# Patient Record
Sex: MALE | Race: WHITE | NOT HISPANIC OR LATINO | Employment: FULL TIME | URBAN - METROPOLITAN AREA
[De-identification: names, ages, dates, MRNs, and addresses within clinical notes are randomized per-mention and may not be internally consistent; named-entity substitution may affect disease eponyms.]

---

## 2019-08-31 ENCOUNTER — HOSPITAL ENCOUNTER (EMERGENCY)
Facility: HOSPITAL | Age: 44
Discharge: HOME | End: 2019-08-31
Attending: EMERGENCY MEDICINE
Payer: COMMERCIAL

## 2019-08-31 VITALS
TEMPERATURE: 99 F | BODY MASS INDEX: 27.16 KG/M2 | HEART RATE: 73 BPM | DIASTOLIC BLOOD PRESSURE: 90 MMHG | WEIGHT: 230 LBS | OXYGEN SATURATION: 99 % | HEIGHT: 77 IN | SYSTOLIC BLOOD PRESSURE: 138 MMHG | RESPIRATION RATE: 18 BRPM

## 2019-08-31 DIAGNOSIS — S63.259A DISLOCATION OF FINGER, INITIAL ENCOUNTER: Primary | ICD-10-CM

## 2019-08-31 PROCEDURE — 29130 APPL FINGER SPLINT STATIC: CPT | Mod: F7

## 2019-08-31 PROCEDURE — 63600175 PHARM REV CODE 636 W HCPCS: Performed by: EMERGENCY MEDICINE

## 2019-08-31 PROCEDURE — 26770 TREAT FINGER DISLOCATION: CPT | Mod: F7

## 2019-08-31 PROCEDURE — 96372 THER/PROPH/DIAG INJ SC/IM: CPT | Mod: 59

## 2019-08-31 PROCEDURE — 25000003 PHARM REV CODE 250: Performed by: EMERGENCY MEDICINE

## 2019-08-31 PROCEDURE — 99284 EMERGENCY DEPT VISIT MOD MDM: CPT | Mod: 25

## 2019-08-31 RX ORDER — KETOROLAC TROMETHAMINE 10 MG/1
10 TABLET, FILM COATED ORAL EVERY 6 HOURS PRN
Qty: 28 TABLET | Refills: 0 | Status: SHIPPED | OUTPATIENT
Start: 2019-08-31

## 2019-08-31 RX ORDER — LIDOCAINE HYDROCHLORIDE 10 MG/ML
10 INJECTION INFILTRATION; PERINEURAL
Status: COMPLETED | OUTPATIENT
Start: 2019-08-31 | End: 2019-08-31

## 2019-08-31 RX ORDER — MORPHINE SULFATE 4 MG/ML
8 INJECTION, SOLUTION INTRAMUSCULAR; INTRAVENOUS
Status: COMPLETED | OUTPATIENT
Start: 2019-08-31 | End: 2019-08-31

## 2019-08-31 RX ORDER — ACETAMINOPHEN 500 MG
500 TABLET ORAL EVERY 6 HOURS PRN
Qty: 28 TABLET | Refills: 0 | Status: SHIPPED | OUTPATIENT
Start: 2019-08-31

## 2019-08-31 RX ADMIN — MORPHINE SULFATE 8 MG: 4 INJECTION INTRAVENOUS at 05:08

## 2019-08-31 RX ADMIN — LIDOCAINE HYDROCHLORIDE 10 ML: 10 INJECTION, SOLUTION INFILTRATION; PERINEURAL at 05:08

## 2019-08-31 NOTE — ED TRIAGE NOTES
Pt presented to the ED after falling in his garage and dislocating his finger.  Pt states he fell and caught himself on his right hand and his middle finger got tucked under his hand that was bearing his weight.  Pt driven here by his wife.

## 2019-08-31 NOTE — ED PROVIDER NOTES
Encounter Date: 8/31/2019    SCRIBE #1 NOTE: I, Michelle Ahumada, am scribing for, and in the presence of,  Dr. Mccall. I have scribed the entire note.       History     Chief Complaint   Patient presents with    Hand Pain     Reports fell and landed on hand. Possible dislocation to R 3rd digit.      Morteza Cerrato is a 44 y.o. male who  has no past medical history on file.    The patient presents to the ED due to a hand injury after falling today. Patient says that he normally falls due to his weak ankle and today when he fell, he landed on his right hand. He decided to come to the ED to rule out possible dislocation of his 3rd digit of his right hand. Pain is worse with movement or palpation. He denies numbness, tingling, or any other complaints at this time.  He is left-hand dominant works in IT.     The history is provided by the patient.     Review of patient's allergies indicates:  No Known Allergies  History reviewed. No pertinent past medical history.  History reviewed. No pertinent surgical history.  History reviewed. No pertinent family history.  Social History     Tobacco Use    Smoking status: Never Smoker   Substance Use Topics    Alcohol use: Never     Frequency: Never    Drug use: Never     Review of Systems   Constitutional: Negative for chills and fever.   HENT: Negative for sore throat.    Respiratory: Negative for cough and shortness of breath.    Cardiovascular: Negative for chest pain.   Gastrointestinal: Negative for abdominal pain, nausea and vomiting.   Genitourinary: Negative for dysuria, frequency and urgency.   Musculoskeletal: Negative for back pain, neck pain and neck stiffness.   Skin: Positive for wound. Negative for rash.   Neurological: Negative for syncope and weakness.   Psychiatric/Behavioral: Negative for agitation, behavioral problems and confusion.   All other systems reviewed and are negative.      Physical Exam     Initial Vitals [08/31/19 1647]   BP Pulse Resp Temp SpO2    (!) 152/96 73 18 98.5 °F (36.9 °C) 99 %      MAP       --         Physical Exam    Nursing note and vitals reviewed.  Constitutional: He appears well-developed and well-nourished. He is not diaphoretic. No distress.   HENT:   Head: Normocephalic and atraumatic.   Mouth/Throat: Oropharynx is clear and moist.   Eyes: Conjunctivae are normal.   Cardiovascular: Normal rate and regular rhythm.   Pulmonary/Chest: No respiratory distress.   Musculoskeletal:   Deformity to right 3rd digit, sensation intact  No wrist pain   No snuff box tenderness   Neurological: He is alert and oriented to person, place, and time. No sensory deficit.   Skin: Skin is warm and dry. Capillary refill takes less than 2 seconds. No rash noted. No pallor.   Psychiatric: He has a normal mood and affect.         ED Course   Orthopedic Injury  Date/Time: 8/31/2019 5:26 PM  Performed by: Marty Mccall Jr., MD  Authorized by: Marty Mccall Jr., MD     Injury:     Injury location:  Hand    Location details:  Right hand    Injury type:  Dislocation (Long finger dislocation)      Pre-procedure assessment:     Neurovascular status: Neurovascularly intact      Distal perfusion: normal      Neurological function: normal      Range of motion: reduced      Local anesthesia used?: Yes      Anesthesia:  Digital block    Local anesthetic:  Lidocaine 1% without epinephrine    Anesthetic total (ml):  5    Patient sedated?: No        Selections made in this section will also lock the Injury type section above.:     Manipulation performed: Hyper exaggeration and traction.      Immobilization:  Splint    Splint type:  Static finger    Complications: No    Post-procedure assessment:     Neurovascular status: Neurovascularly intact      Distal perfusion: normal      Neurological function: normal      Range of motion: improved      Patient tolerance:  Patient tolerated the procedure well with no immediate complications     Nurses will be applying the splint on  the patient's right 3rd digit.      Labs Reviewed - No data to display       X-Rays:   Independently Interpreted Readings:   Other Readings:  Reviewed by myself, read by radiology.    Imaging Results          X-Ray Finger 2 or More Views Right (Final result)  Result time 08/31/19 17:45:36    Final result by Cedric Fitzpatrick MD (08/31/19 17:45:36)                 Impression:      As above      Electronically signed by: Cedric Fitzpatrick MD  Date:    08/31/2019  Time:    17:45             Narrative:    EXAMINATION:  XR FINGER 2 OR MORE VIEWS RIGHT    CLINICAL HISTORY:  finger dislocation;    TECHNIQUE:  Three views right 3rd digit    COMPARISON:  Right 3rd digit series earlier same day    FINDINGS:  S/p interval closed reduction of previous dislocation at the 3rd PIP joint, now demonstrating anatomic positioning and alignment.  No displaced fracture seen.  Osseous structures otherwise intact.  Otherwise, no change.                               X-Ray Finger 2 or More Views Right (Final result)  Result time 08/31/19 17:16:15    Final result by Florencia Hou MD (08/31/19 17:16:15)                 Impression:      Dislocation of the 3rd finger PIP joint.      Electronically signed by: Florencia Hou MD  Date:    08/31/2019  Time:    17:16             Narrative:    EXAMINATION:  XR FINGER 2 OR MORE VIEWS RIGHT    CLINICAL HISTORY:  middle finger pain;    COMPARISON:  None    FINDINGS:  There is dislocation of the 3rd finger PIP joint.  Middle phalanx is dislocated posterior and medial to the proximal phalanx.  No fracture is seen.                              Medical Decision Making:   Differential Diagnosis:   Differential Diagnosis includes, but is not limited to:  Fracture, dislocation, compartment syndrome, nerve injury/palsy, vascular injury, rhabdomyolysis, hemarthrosis, septic joint, bursitis, muscle strain, ligament tear/sprain, laceration with foreign body, abrasion, soft tissue contusion, osteoarthritis.  Clinical  Tests:   Radiological Study: Ordered and Reviewed  ED Management:  Right finger dislocation was successfully reduced.  Patient will be placed in splint referral made to Ortho for follow-up in 1 week.  Return precautions for numbness weakness pain or any other concerns.    After taking into careful account the historical factors and physical exam findings of the patient's presentation today, in conjunction with the empirical and objective data obtained on ED workup, no acute emergent medical condition has been identified. The patient appears to be low risk for an emergent medical condition and I feel it is safe and appropriate at this time for the patient to be discharged to follow-up as detailed in their discharge instructions for reevaluation and possible continued outpatient workup and management. I have discussed the specifics of the workup with the patient and the patient has verbalized understanding of the details of the workup, the diagnosis, the treatment plan, and the need for outpatient follow-up.  Although the patient has no emergent etiology today this does not preclude the development of an emergent condition so in addition, I have advised the patient that they can return to the ED and/or activate EMS at any time with worsening of their symptoms, change of their symptoms, or with any other medical complaint.  The patient remained comfortable and stable during their visit in the ED.  Discharge and follow-up instructions discussed with the patient who expressed understanding and willingness to comply with my recommendations.                   ED Course as of Aug 31 1753   Sat Aug 31, 2019   1716 X-Ray reveals posterior dislocation of right PIP with no apparent fracture.    [MA]      ED Course User Index  [MA] Michelle Ahumada     Clinical Impression:     1. Dislocation of finger, initial encounter        Disposition:   Disposition: Discharged  Condition: Stable       Scribe attestation WINSTON, Marty Mccall,   personally performed the services described in this documentation. All medical record entries made by the scribe were at my direction and in my presence.  I have reviewed the chart and agree that the record reflects my personal performance and is accurate and complete. Marty Mccall M.D. 6:22 PM08/31/2019      Portions of this note were dictated using voice recognition software and may contain dictation related errors in spelling/grammar/syntax not found on text review                 Marty Mccall Jr., MD  08/31/19 7743

## 2019-09-23 ENCOUNTER — TELEPHONE (OUTPATIENT)
Dept: ORTHOPEDICS | Facility: CLINIC | Age: 44
End: 2019-09-23

## 2019-09-23 NOTE — TELEPHONE ENCOUNTER
----- Message from Milagro Sapp sent at 9/23/2019  2:18 PM CDT -----  Contact: Morteza Cerrato  [phone]    Patient dislocated the middle finger on R hand and had it set in the ER. He states it is still swollen and not healing properly. He was advised to see Dr. Flores for follow up.

## 2019-09-30 ENCOUNTER — HOSPITAL ENCOUNTER (OUTPATIENT)
Dept: RADIOLOGY | Facility: HOSPITAL | Age: 44
Discharge: HOME | End: 2019-09-30
Attending: ORTHOPAEDIC SURGERY
Payer: COMMERCIAL

## 2019-09-30 ENCOUNTER — OFFICE VISIT (OUTPATIENT)
Dept: ORTHOPEDICS | Facility: CLINIC | Age: 44
End: 2019-09-30
Payer: COMMERCIAL

## 2019-09-30 VITALS — WEIGHT: 230 LBS | BODY MASS INDEX: 27.16 KG/M2 | HEIGHT: 77 IN

## 2019-09-30 DIAGNOSIS — S63.259D DISLOCATION OF FINGER, SUBSEQUENT ENCOUNTER: Primary | ICD-10-CM

## 2019-09-30 DIAGNOSIS — S63.259D DISLOCATION OF FINGER, SUBSEQUENT ENCOUNTER: ICD-10-CM

## 2019-09-30 PROCEDURE — 73140 X-RAY EXAM OF FINGER(S): CPT | Mod: TC,PN

## 2019-09-30 PROCEDURE — 99999 PR PBB SHADOW E&M-EST. PATIENT-LVL III: ICD-10-PCS | Mod: PBBFAC,,, | Performed by: ORTHOPAEDIC SURGERY

## 2019-09-30 PROCEDURE — 73140 XR FINGER 2 OR MORE VIEWS: ICD-10-PCS | Mod: 26,RT,, | Performed by: RADIOLOGY

## 2019-09-30 PROCEDURE — 99203 OFFICE O/P NEW LOW 30 MIN: CPT | Mod: S$GLB,,, | Performed by: ORTHOPAEDIC SURGERY

## 2019-09-30 PROCEDURE — 73140 X-RAY EXAM OF FINGER(S): CPT | Mod: 26,RT,, | Performed by: RADIOLOGY

## 2019-09-30 PROCEDURE — 99999 PR PBB SHADOW E&M-EST. PATIENT-LVL III: CPT | Mod: PBBFAC,,, | Performed by: ORTHOPAEDIC SURGERY

## 2019-09-30 PROCEDURE — 99203 PR OFFICE/OUTPT VISIT, NEW, LEVL III, 30-44 MIN: ICD-10-PCS | Mod: S$GLB,,, | Performed by: ORTHOPAEDIC SURGERY

## 2019-09-30 RX ORDER — DICLOFENAC SODIUM 20 MG/G
40 SOLUTION TOPICAL 2 TIMES DAILY
Qty: 1 BOTTLE | Refills: 1 | Status: SHIPPED | OUTPATIENT
Start: 2019-09-30

## 2019-09-30 NOTE — PROGRESS NOTES
"Subjective:      Patient ID: Morteza Cerrato is a 44 y.o. male.    Chief Complaint: Pain, Swelling, and Injury of the Right Hand      HPI: Morteza Cerrato is here in follow-up of emergency department visit.  Patient sustained a fall approximately 1 month ago where he dislocated the right 3rd PIP joint.  The finger was successfully reduced in the emergency department.  The patient has been wearing a splint on and off for the last month.  He reports wearing the splint every night because if he does not he noticed the finger would not stay in extension. Today, he complains of continued pain, swelling,  And decreased range of motion. He denies any additional injury/ dislocation.  He has been using 600 mg Advil 3 times daily with minimal benefit.     History reviewed. No pertinent past medical history.    Current Outpatient Medications:     acetaminophen (TYLENOL) 500 MG tablet, Take 1 tablet (500 mg total) by mouth every 6 (six) hours as needed for Pain., Disp: 28 tablet, Rfl: 0    diclofenac sodium (PENNSAID) 20 mg/gram /actuation(2 %) sopm, Apply 40 mg topically 2 (two) times daily., Disp: 1 Bottle, Rfl: 1    ketorolac (TORADOL) 10 mg tablet, Take 1 tablet (10 mg total) by mouth every 6 (six) hours as needed for Pain. (Patient not taking: Reported on 9/30/2019), Disp: 28 tablet, Rfl: 0  Review of patient's allergies indicates:  No Known Allergies    Ht 6' 5" (1.956 m)   Wt 104.3 kg (230 lb)   BMI 27.27 kg/m²     Review of Systems   Constitution: Negative for chills and fever.   Cardiovascular: Negative for chest pain and palpitations.   Respiratory: Negative for shortness of breath and wheezing.    Skin: Negative for poor wound healing and rash.   Musculoskeletal: Positive for joint pain, joint swelling and stiffness.   Gastrointestinal: Negative for nausea and vomiting.   Genitourinary: Negative for dysuria and hematuria.   Neurological: Negative for seizures and tremors.   Psychiatric/Behavioral: Negative for " altered mental status.   Allergic/Immunologic: Negative for environmental allergies and persistent infections.         Objective:    Ortho Exam    right upper extremity:  Significant for swelling  At the 3rd PIP joint.  Significant tenderness to palpation dorsally and laterally.  Range of motion severely limited --  Maybe 10° of flexion.  Extension full. No significant instability.  No deformity. Remaining fingers range of motion full.  Sensation intact.  Pulses present.  Cap refill brisk.  GEN: Well developed, well nourished   Male. AAOX3. No acute distress.   Normocephalic, atraumatic.   JASON  Breathing unlabored.  Mood and affect  appropriate.         Assessment:     Imaging:  Repeat radiographs from today reveals a well aligned and well reducedPIP joint of the 3rd finger. There is significant soft tissue swelling.  There is no sign of fracture.        1. Dislocation of finger, subsequent encounter          Plan:        explained the nature of the problem to the patient in regards to stiffness status post immobilization after dislocation.  Explained radiographic findings.   I recommended milla taping for the next few days to get the finger moving.   I also recommended warm water soaks with manual/ passive ROM 5x  Daily   start physical therapy   start topical anti-inflammatory   continue  Advil    Orders Placed This Encounter    X-Ray Finger 2 or More Views    Ambulatory Referral to Physical/Occupational Therapy    diclofenac sodium (PENNSAID) 20 mg/gram /actuation(2 %) sopm     Follow up in about 6 weeks (around 11/11/2019).

## 2019-10-03 ENCOUNTER — CLINICAL SUPPORT (OUTPATIENT)
Dept: REHABILITATION | Facility: HOSPITAL | Age: 44
End: 2019-10-03
Payer: COMMERCIAL

## 2019-10-03 DIAGNOSIS — M79.644 PAIN IN RIGHT FINGER(S): ICD-10-CM

## 2019-10-03 DIAGNOSIS — M25.641 FINGER STIFFNESS, RIGHT: ICD-10-CM

## 2019-10-03 DIAGNOSIS — Z78.9 DECREASED INDEPENDENCE WITH ACTIVITIES OF DAILY LIVING: ICD-10-CM

## 2019-10-03 PROCEDURE — 97022 WHIRLPOOL THERAPY: CPT | Mod: PN

## 2019-10-03 PROCEDURE — 97165 OT EVAL LOW COMPLEX 30 MIN: CPT | Mod: PN

## 2019-10-03 PROCEDURE — 97110 THERAPEUTIC EXERCISES: CPT | Mod: PN

## 2019-10-03 NOTE — PATIENT INSTRUCTIONS
[facility] [place]  [phone]  BRITNEY SANCHEZ, OTR/L, CHT  OCCUPATIONAL THERAPIST, CERTIFIED HAND THERAPIST    OCCUPATIONAL THERAPY HOME EXERCISE PROGRAM

## 2019-10-04 NOTE — PLAN OF CARE
[facility] Occupational Therapy  Initial Evaluation     Date: 10/3/2019  Name: Morteza Cerrato  Clinic Number: [id number]    Therapy Diagnosis:   Encounter Diagnoses   Name Primary?    Pain in right finger(s)     Finger stiffness, right     Decreased independence with activities of daily living      Physician: Kenzie Watkins PA-C    Physician Orders: eval and treat  Medical Diagnosis: LF PIP dorsal dislocation   Date of Injury: 08//31/2019  Evaluation Date: 10/03/2019  Insurance Authorization Period Expiration: 12/31/2019  Plan of Care Certification Period: 10/03/2019-11/29/2019  Date of Return to MD: 10/22/2019    Visit # / Visits authorized: 1 / 50    FOTO: initial eval    Time In:1245pm  Time Out: 145pm  Total treatment time: 60minutes  Total Timed [id number] minutes    Precautions:  Standard    Subjective     Involved Side: Left  Dominant Side: Right  Date of Onset: 08/31/2019  Mechanism of Injury: trauma  History of Current Condition: Pt. States his weakness in his ankle caused him to fall on his hand. He was seen in Urgent for dislocation. He had a complex dorsal dislocation that was reduced in the Urgent Care. He was splinted with an alumafoam brace that pt states when from base of palm up and around the tip of the finger and back to the base of the dorsal hand. He wore that for a few days and then started milla taping per MD instructions. He f/u with hand specialist about a month later due to increased swelling and stiffness. He presents today for initial evaluation for occupational therapy.  He states he is no longer wearing the splint.   Surgical Procedure: NA  Imaging: see  EMR for xray  Previous Therapy: none    Past Medical History/Physical Systems Review:   Morteza Cerrato  has no past medical history on file.    Morteza Cerrato  has no past surgical history on file.    Morteza has a current medication list which includes the following prescription(s): acetaminophen, diclofenac  "sodium, and ketorolac.    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     Current FunctionalStatus   Home/Living environment : lives with their family ( 4 children with youngest being y/o)      Limitation of Functional Status as follows:   ADLs/IADLs:     - Feeding: using fork and knife    - Bathing: no difficulty    - Dressing/Grooming: tying shoes, typing up grocery bags, tucking shirts in, reaching into pocket     - Driving: slight difficulty turning key in car     Leisure: Gardening- mowing grass        Objective     Observation/Appearance: significant swelling noted at PIP joint, joint resting in flexed position         Edema. Measured in centimeters.   10/3/2019 10/3/2019    Left Right   Long:       P1 6.7 7.5    PIP 6.7 8.0   P2 5.2 7.5    DIP 5.7 6.9         Hand ROM. Measured in degrees.  Ext measured laterally*    10/3/2019 10/3/2019    Left Right    AROM AROM        Long:  MP 95 70               25/60              DIP 90 7/30              CANCINO 290 128                CMS Impairment/Limitation/Restriction for FOTO hand Survey    Therapist reviewed FOTO scores for Morteza Cerrato on 10/3/2019.   FOTO documents entered into hint - see Media section.    Limitation Score: 42%       Treatment     Treatment Time In: 120pm  Treatment Time Out: 145pm  Total Treatment time separate from Evaluation time:25 min    Morteza received the following supervised modalities after being cleared for contradictions for 15 minutes:   -Fluidotherapy: To " right hand for 15 min, continuous air, 115 deg, air speed 50 to decrease pain, edema & scar tissue, sensory re- education, and increased tissue extensibility prior to therex          Morteza received the following manual therapy techniques for 1 minutes:   -Performed RM to right index finger to decrease edema, improve joint mobility, decrease pain and improve lymphatic drainage to increase hand function.       Morteza received therapeutic exercises for 9 minutes including:  -  AROM   DIP blocking  PIP blocking   X 10 reps each    Hook  Wave  Straight fist/reverse block  Hook to composite fist   Finger lifts     X 10 reps each            Home Exercise Program/Education:  Issued HEP (see patient instructions in EMR) and educated on modality use for pain management . Exercises were reviewed and Morteza was able to demonstrate them prior to the end of the session.   Pt received a written copy of exercises to perform at home. Morteza demonstrated good  understanding of the education provided.  Pt was advised to perform these exercises free of pain, and to stop performing them if pain occurs.    Patient/Family Education: role of OT, goals for OT, scheduling/cancellations - pt verbalized understanding. Discussed insurance limitations with patient.    Additional Education provided: risk of increasing stiffness and swelling with over use, coban wrapping at night, milla taping    Assessment     Morteza Cerrato is a 44 y.o. male referred to outpatient occupational therapy and presents with a medical diagnosis of right index finger dorsal pip dislocation , resulting in continued swelling and stiffness deterring full functional use and demonstrates limitations as described in the chart below. Following medical record review it is determined that pt will benefit from occupational therapy services in order to maximize pain free and/or functional use of right   hand. The following goals were discussed with the patient and patient is  in agreement with them as to be addressed in the treatment plan. The patient's rehab potential is Excellent.     Anticipated barriers to occupational therapy: none  Pt has no cultural, educational or language barriers to learning provided.    Profile and History Assessment of Occupational Performance Level of Clinical Decision Making Complexity Score   Occupational Profile:   Morteza Cerrato is a 44 y.o. male who lives with their family and is currently employed as . Morteza Cerrato has difficulty with  feeding, bathing, grooming and dressing  shopping, phone/computer use and housework/household chores  affecting his/her daily functional abilities. His/her main goal for therapy is to regain motion in his finger.     Comorbidities:   No past medical history on file.      Medical and Therapy History Review:   Brief               Performance Deficits    Physical:  Joint Mobility  Muscle Power/Strength  Muscle Endurance  Skin Integrity/Scar Formation  Edema   Strength  Pinch Strength  Gross Motor Coordination  Fine Motor Coordination  Pain    Cognitive:  No Deficits    Psychosocial:    No Deficits     Clinical Decision Making:  low    Assessment Process:  Problem-Focused Assessments    Modification/Need for Assistance:  Not Necessary    Intervention Selection:  Several Treatment Options       low  Based on PMHX, co morbidities , data from assessments and functional level of assistance required with task and clinical presentation directly impacting function.         Goals:   The following goals were discussed with the patient and patient is in agreement with them as to be addressed in the treatment plan.   Long Term Goals (LTGs); to be met by discharge.  LTG #1: Pt will report a pain level of 0 out of 10 with full gripping   LTG #2: Pt will demo improved FOTO score by 20 points.   LTG #3: Pt will return to prior level of function for ADLs and household management.     Short Term Goals (STGs); to be met within  4 weeks (11/01/2019).  STG #1a: Pt will report 2 out of 10 pain level with full active range of motion..  STG #2a: Pt will report/demo [place] with tying grocery bags for diaper disposal  STG #2b: Pt will report/demo [place] with using fork and knife for feeding.  STG #3a: Pt will demonstrate independence with issued HEP.   STG #3b: Pt will demo improved Right LF CANCINO by 50  degrees needed to aid with lifting and carrying young children.        Plan   Certification Period/Plan of care expiration: 10/3/2019 to 11/29/2019    Outpatient Occupational Therapy 2 times weekly for 8 weeks to include the following interventions: Paraffin, Fluidotherapy, Manual therapy/joint mobilizations, Modalities for pain management, US 3 mhz, Therapeutic exercises/activities., Strengthening, Orthotic Fabrication/Fit/Training, Edema Control, Scar Management, Wound Care, Electrical Modalities and Joint Protection.      Sheba Hall, OTR/L,CHT

## 2019-10-07 ENCOUNTER — CLINICAL SUPPORT (OUTPATIENT)
Dept: REHABILITATION | Facility: HOSPITAL | Age: 44
End: 2019-10-07
Payer: COMMERCIAL

## 2019-10-07 DIAGNOSIS — M79.644 PAIN IN RIGHT FINGER(S): ICD-10-CM

## 2019-10-07 DIAGNOSIS — Z78.9 DECREASED INDEPENDENCE WITH ACTIVITIES OF DAILY LIVING: ICD-10-CM

## 2019-10-07 DIAGNOSIS — M25.641 FINGER STIFFNESS, RIGHT: ICD-10-CM

## 2019-10-07 PROCEDURE — 97110 THERAPEUTIC EXERCISES: CPT | Mod: PN

## 2019-10-07 PROCEDURE — 97022 WHIRLPOOL THERAPY: CPT | Mod: PN

## 2019-10-07 PROCEDURE — 97140 MANUAL THERAPY 1/> REGIONS: CPT | Mod: PN

## 2019-10-07 NOTE — PROGRESS NOTES
"  Occupational Therapy Daily Treatment Note      Date: 10/7/2019  Name: Morteza Cerrato  Clinic Number: [id number]    Therapy Diagnosis:   Encounter Diagnoses   Name Primary?    Pain in right finger(s)     Finger stiffness, right     Decreased independence with activities of daily living      Physician: Kenzie Watkins PA-C    Physician Orders: eval and treat  Medical Diagnosis: LF PIP dorsal dislocation   Date of Injury: 08//31/2019  Evaluation Date: 10/03/2019  Insurance Authorization Period Expiration: 12/31/2019  Plan of Care Certification Period: 10/03/2019-11/29/2019  Date of Return to MD: 10/22/2019     Visit # / Visits authorized: 2 / 50     FOTO: initial eval     Time In:4pm  Time Out:5pm  Total treatment time: 60minutes  Total Timed minutes: 45 minutes     Precautions:  Standard    Subjective     Pt reports: " I've been doing the exercises- they haven't been fun but I'm doing it. I feel like it's loosening up on the bending a little"   he was compliant with home exercise program given last session.   Response to previous treatment:continued pain, swelling, stiffness  Functional change: none reported    Pain: 0/10  Location: right hand at rest, higher with activities    Objective        Edema. Measured in centimeters.    10/3/2019 10/3/2019     Left Right   Long:         P1 6.7 7.5    PIP 6.7 8.0   P2 5.2 7.5    DIP 5.7 6.9            Hand ROM. Measured in degrees.  Ext measured laterally*     10/3/2019 10/3/2019     Left Right     AROM AROM           Long:  MP 95 70               25/60              DIP 90 7/30              CANCINO 290 128                      Morteza received the following supervised modalities after being cleared for contradictions for 15 minutes:   -Fluidotherapy: To right hand for 15 min, continuous air, 115 deg, air speed 50 to decrease pain, edema & scar tissue, sensory re- education, and increased tissue extensibility prior to therex              Morteza received the following " manual therapy techniques for 10 minutes:   -Performed RM to right index finger to decrease edema, improve joint mobility, decrease pain and improve lymphatic drainage to increase hand function.   -use of iastm tool for deep tissue massage to volar flexors  -cross friction massage to palmar flexor about A1 pulley  - gentle grade II joint glides to PIP  - intrinsic stretches to PIP, DIP        Morteza received therapeutic exercises for 35 minutes including:  -  AROM   DIP blocking  PIP blocking    X 10 reps each    Isospheres X 3 min   Towel scrunches X 3 min   Rice/Beans gross grasp X 3 min each   Med pom pom p/u LF to palm x 3 containers   Med pom pom hook X 3 containers             Home Exercises and Education Provided     Education provided: cont HEP, continue milla strap, LMB size C on 15 min 2x day  - Progress towards goals     Written Home Exercises Provided: Patient instructed to cont prior HEP.  Exercises were reviewed and Morteza was able to demonstrate them prior to the end of the session.  Morteza demonstrated good  understanding of the education provided.   .   See EMR under Patient Instructions for exercises provided initial eval.     Assessment   Pt. Presents for first visit after initial evaluation. Pt. Reports compliance with exercises. Pt. Reports continued swelling and pain and limitations to daily use. Pt. Participated with slight pain increase with exercises this date. Applied LMB size C for low load long progression stretch 15 min 2 x day. Pt. Instructed to apply ice at home if needed for pain and swelling tonight and tomorrow. Pt. Verbalized understanding.     Morteza is progressing well towards his goals and there are no updates to goals at this time. Pt prognosis continues as Excellent. Pt will continue to benefit from skilled outpatient occupational therapy to address the deficits listed in the problem list on initial evaluation, provide pt/family education and to maximize pt's level of  independence in the home and community environment.     Anticipated barriers to continued occupational therapy: high grade injury    Pt's spiritual, cultural and educational needs considered and pt agreeable to plan of care and goals.    Goals      Goals:   The following goals were discussed with the patient and patient is in agreement with them as to be addressed in the treatment plan.   Long Term Goals (LTGs); to be met by discharge.  LTG #1: Pt will report a pain level of 0 out of 10 with full gripping   -progressing    LTG #2: Pt will demo improved FOTO score by 20 points.  -progressing  LTG #3: Pt will return to prior level of function for ADLs and household management.  -progressing     Short Term Goals (STGs); to be met within 4 weeks (11/01/2019).  STG #1a: Pt will report 2 out of 10 pain level with full active range of motion.. -progressing  STG #2a: Pt will report/demo [place] with tying grocery bags for diaper disposal -progressing  STG #2b: Pt will report/demo [place] with using fork and knife for feeding. -progressing  STG #3a: Pt will demonstrate independence with issued HEP.  -progressing  STG #3b: Pt will demo improved Right LF CANCINO by 50  degrees needed to aid with lifting and carrying young children. -progressing       Plan     Continue skilled occupational therapy with individualized plan of care focusing on increasing AROM and strength required for ADLs      Updates/Grading for next session: cont to progress as able    Sheba Hall OTR/L,CHT

## 2019-10-09 ENCOUNTER — CLINICAL SUPPORT (OUTPATIENT)
Dept: REHABILITATION | Facility: HOSPITAL | Age: 44
End: 2019-10-09
Payer: COMMERCIAL

## 2019-10-09 DIAGNOSIS — M25.641 FINGER STIFFNESS, RIGHT: ICD-10-CM

## 2019-10-09 DIAGNOSIS — Z78.9 DECREASED INDEPENDENCE WITH ACTIVITIES OF DAILY LIVING: ICD-10-CM

## 2019-10-09 DIAGNOSIS — M79.644 PAIN IN RIGHT FINGER(S): ICD-10-CM

## 2019-10-09 PROCEDURE — 97022 WHIRLPOOL THERAPY: CPT | Mod: PN

## 2019-10-09 PROCEDURE — 97140 MANUAL THERAPY 1/> REGIONS: CPT | Mod: PN

## 2019-10-09 PROCEDURE — 97110 THERAPEUTIC EXERCISES: CPT | Mod: PN

## 2019-10-09 NOTE — PROGRESS NOTES
"  Occupational Therapy Daily Treatment Note      Date: 10/9/2019  Name: Morteza Cerrato  Clinic Number: [id number]    Therapy Diagnosis:   Encounter Diagnoses   Name Primary?    Pain in right finger(s)     Finger stiffness, right     Decreased independence with activities of daily living      Physician: Kenzie Watkins PA-C    Physician Orders: eval and treat  Medical Diagnosis: LF PIP dorsal dislocation   Date of Injury: 08//31/2019  Evaluation Date: 10/03/2019  Insurance Authorization Period Expiration: 12/31/2019  Plan of Care Certification Period: 10/03/2019-11/29/2019  Date of Return to MD: 10/22/2019     Visit # / Visits authorized: 3 / 50     FOTO: initial eval     Time In:1030am  Time Out:1130am  Total treatment time: 60minutes  Total Timed minutes: 45 minutes     Precautions:  Standard    Subjective     Pt reports: " I was able to wear the splint. It did ok. It was a little swollen in the palm after last visit"   he was compliant with home exercise program given last session.   Response to previous treatment:continued pain, swelling, stiffness  Functional change: none reported    Pain: 0/10  Location: right hand at rest, higher with activities    Objective        Edema. Measured in centimeters.    10/3/2019 10/3/2019     Left Right   Long:         P1 6.7 7.5    PIP 6.7 8.0   P2 5.2 7.5    DIP 5.7 6.9            Hand ROM. Measured in degrees.  Ext measured laterally*     10/3/2019 10/3/2019     Left Right     AROM AROM           Long:  MP 95 70               25/60              DIP 90 7/30              CANCINO 290 128                      Morteza received the following supervised modalities after being cleared for contradictions for 15 minutes:   -Fluidotherapy: To right hand for 15 min, continuous air, 115 deg, air speed 50 to decrease pain, edema & scar tissue, sensory re- education, and increased tissue extensibility prior to therex              Morteza received the following manual therapy " techniques for 10 minutes:   -Performed RM to right index finger to decrease edema, improve joint mobility, decrease pain and improve lymphatic drainage to increase hand function.   -use of iastm tool for deep tissue massage to volar flexors  -cross friction massage to palmar flexor about A1 pulley  - gentle grade II joint glides to PIP  - intrinsic stretches to PIP, DIP        Morteza received therapeutic exercises for 35 minutes including:  -  AROM   DIP blocking  PIP blocking    X 10 reps each    Isospheres X 3 min   Towel scrunches X 3 min   Rice/Beans gross grasp X 3 min each   Med pom pom p/u LF to palm x 3 containers   Med pom pom hook X 3 containers   Med pom pom flicks X 3 containers             Home Exercises and Education Provided     Education provided: cont HEP, continue milla strap, LMB size C on 15 min 2x day  - Progress towards goals     Written Home Exercises Provided: Patient instructed to cont prior HEP.  Exercises were reviewed and Morteza was able to demonstrate them prior to the end of the session.  Morteza demonstrated good  understanding of the education provided.   .   See EMR under Patient Instructions for exercises provided initial eval.     Assessment    Pt. Reports compliance with exercises and LMB orthosis,  Pt. Reports continued swelling and pain and limitations to daily use. Pt. Participated with slight pain increase with exercises this date. Pt. Reports pain over the DIP dorsally.Added LF flicks to help with tendon excursion.   Morteza is progressing well towards his goals and there are no updates to goals at this time. Pt prognosis continues as Excellent. Pt will continue to benefit from skilled outpatient occupational therapy to address the deficits listed in the problem list on initial evaluation, provide pt/family education and to maximize pt's level of independence in the home and community environment.     Anticipated barriers to continued occupational therapy: high grade  injury    Pt's spiritual, cultural and educational needs considered and pt agreeable to plan of care and goals.    Goals      Goals:   The following goals were discussed with the patient and patient is in agreement with them as to be addressed in the treatment plan.   Long Term Goals (LTGs); to be met by discharge.  LTG #1: Pt will report a pain level of 0 out of 10 with full gripping   -progressing    LTG #2: Pt will demo improved FOTO score by 20 points.  -progressing  LTG #3: Pt will return to prior level of function for ADLs and household management.  -progressing     Short Term Goals (STGs); to be met within 4 weeks (11/01/2019).  STG #1a: Pt will report 2 out of 10 pain level with full active range of motion.. -progressing  STG #2a: Pt will report/demo [place] with tying grocery bags for diaper disposal -progressing  STG #2b: Pt will report/demo [place] with using fork and knife for feeding. -progressing  STG #3a: Pt will demonstrate independence with issued HEP.  -progressing  STG #3b: Pt will demo improved Right LF CANCINO by 50  degrees needed to aid with lifting and carrying young children. -progressing       Plan     Continue skilled occupational therapy with individualized plan of care focusing on increasing AROM and strength required for ADLs      Updates/Grading for next session: cont to progress as able    STEVE Friedman/ISHMAEL,CHT

## 2019-10-14 ENCOUNTER — CLINICAL SUPPORT (OUTPATIENT)
Dept: REHABILITATION | Facility: HOSPITAL | Age: 44
End: 2019-10-14
Payer: COMMERCIAL

## 2019-10-14 DIAGNOSIS — M25.641 FINGER STIFFNESS, RIGHT: ICD-10-CM

## 2019-10-14 DIAGNOSIS — M79.644 PAIN IN RIGHT FINGER(S): ICD-10-CM

## 2019-10-14 DIAGNOSIS — Z78.9 DECREASED INDEPENDENCE WITH ACTIVITIES OF DAILY LIVING: ICD-10-CM

## 2019-10-14 PROCEDURE — 97022 WHIRLPOOL THERAPY: CPT | Mod: PN

## 2019-10-14 PROCEDURE — 97140 MANUAL THERAPY 1/> REGIONS: CPT | Mod: PN

## 2019-10-14 PROCEDURE — 97110 THERAPEUTIC EXERCISES: CPT | Mod: PN

## 2019-10-14 NOTE — PROGRESS NOTES
"  Occupational Therapy Daily Treatment Note      Date: 10/14/2019  Name: Morteza Cerrato  Clinic Number: [id number]    Therapy Diagnosis:   Encounter Diagnoses   Name Primary?    Pain in right finger(s)     Finger stiffness, right     Decreased independence with activities of daily living      Physician: Kenzie Watkins PA-C    Physician Orders: eval and treat  Medical Diagnosis: LF PIP dorsal dislocation   Date of Injury: 08//31/2019  Evaluation Date: 10/03/2019  Insurance Authorization Period Expiration: 12/31/2019  Plan of Care Certification Period: 10/03/2019-11/29/2019  Date of Return to MD: 10/22/2019     Visit # / Visits authorized: 3 / 50     FOTO: initial eval     Time In:4pm  Time Out:5pm  Total treatment time: 60minutes  Total Timed minutes: 45 minutes     Precautions:  Standard    Subjective     Pt reports: " It's just not going as straight"   he was compliant with home exercise program given last session.   Response to previous treatment:continued pain, swelling, stiffness  Functional change: none reported    Pain: 0/10  Location: right hand at rest, higher with activities    Objective        Edema. Measured in centimeters.    10/3/2019 10/3/2019 10/14/2019     Left Right Right   Long:          P1 6.7 7.5 7.5    PIP 6.7 8.0 7.9   P2 5.2 7.5 6.7    DIP 5.7 6.9 5.9            Hand ROM. Measured in degrees.  Ext measured laterally*     10/3/2019 10/3/2019 10/14/2019     Left Right Right     AROM AROM AROM            Long:  MP 95 70 94               25/60 25/65              DIP 90 7/30 5/35              CANCINO 290 128 164 (+36)                       Morteza received the following supervised modalities after being cleared for contradictions for 10 minutes:   -Fluidotherapy: To right hand , continuous air, 115 deg, air speed 50 to decrease pain, edema & scar tissue, sensory re- education, and increased tissue extensibility prior to therex              Morteza received the following manual therapy " techniques for 10 minutes:   -Performed RM to right index finger to decrease edema, improve joint mobility, decrease pain and improve lymphatic drainage to increase hand function.   -use of iastm tool for deep tissue massage to volar flexors  -cross friction massage to palmar flexor about A1 pulley  - gentle grade II joint glides to PIP  - intrinsic stretches to PIP, DIP        Morteza received therapeutic exercises for 35 minutes including:  -  AROM   DIP blocking  PIP blocking    X 10 reps each    Isospheres X 3 min   Towel scrunches X 3 min   Rice gross grasp X 3 min each   Med pom pom p/u LF to palm x 3 containers   Med pom pom hook X 3 containers     Velcro board pushes X 10 passes   Digit abd/add X 20 with pink foam/Yellow rubber band             Home Exercises and Education Provided     Education provided: cont HEP, continue milla strap, LMB size C on 30 min 2x day  - Progress towards goals     Written Home Exercises Provided: Patient instructed to cont prior HEP.  Exercises were reviewed and Morteza was able to demonstrate them prior to the end of the session.  Morteza demonstrated good  understanding of the education provided.   .   See EMR under Patient Instructions for exercises provided initial eval.     Assessment    Pt. Reports the LMB orthosis could be a little stronger. Instructed pt on how to straighten out more at home.  Reassessment with 36* CANCINO but no increase in PIP straightening. Pt. Reports continued swelling and pain and limitations to daily use. Pt. Participated with slight pain increase with exercises this date. Pt. Reports pain over the DIP dorsally.Cont LF flicks to help with tendon excursion.   Morteza is progressing well towards his goals and there are no updates to goals at this time. Pt prognosis continues as Excellent. Pt will continue to benefit from skilled outpatient occupational therapy to address the deficits listed in the problem list on initial evaluation, provide pt/family  education and to maximize pt's level of independence in the home and community environment.     Anticipated barriers to continued occupational therapy: high grade injury    Pt's spiritual, cultural and educational needs considered and pt agreeable to plan of care and goals.    Goals      Goals:   The following goals were discussed with the patient and patient is in agreement with them as to be addressed in the treatment plan.   Long Term Goals (LTGs); to be met by discharge.  LTG #1: Pt will report a pain level of 0 out of 10 with full gripping   -progressing    LTG #2: Pt will demo improved FOTO score by 20 points.  -progressing  LTG #3: Pt will return to prior level of function for ADLs and household management.  -progressing     Short Term Goals (STGs); to be met within 4 weeks (11/01/2019).  STG #1a: Pt will report 2 out of 10 pain level with full active range of motion.. -progressing  STG #2a: Pt will report/demo [place] with tying grocery bags for diaper disposal -progressing  STG #2b: Pt will report/demo [place] with using fork and knife for feeding. -progressing  STG #3a: Pt will demonstrate independence with issued HEP.  -progressing  STG #3b: Pt will demo improved Right LF CANCINO by 50  degrees needed to aid with lifting and carrying young children. -progressing       Plan     Continue skilled occupational therapy with individualized plan of care focusing on increasing AROM and strength required for ADLs      Updates/Grading for next session: cont to progress as able    Sheba Hall OTR/L,CHT

## 2019-10-16 ENCOUNTER — CLINICAL SUPPORT (OUTPATIENT)
Dept: REHABILITATION | Facility: HOSPITAL | Age: 44
End: 2019-10-16
Payer: COMMERCIAL

## 2019-10-16 DIAGNOSIS — M79.644 PAIN IN RIGHT FINGER(S): ICD-10-CM

## 2019-10-16 DIAGNOSIS — M25.641 FINGER STIFFNESS, RIGHT: ICD-10-CM

## 2019-10-16 DIAGNOSIS — Z78.9 DECREASED INDEPENDENCE WITH ACTIVITIES OF DAILY LIVING: ICD-10-CM

## 2019-10-16 PROCEDURE — 97110 THERAPEUTIC EXERCISES: CPT | Mod: PN

## 2019-10-16 PROCEDURE — 97140 MANUAL THERAPY 1/> REGIONS: CPT | Mod: PN

## 2019-10-16 PROCEDURE — 97022 WHIRLPOOL THERAPY: CPT | Mod: PN

## 2019-10-16 NOTE — PROGRESS NOTES
"  Occupational Therapy Daily Treatment Note      Date: 10/16/2019  Name: Morteza Cerrato  Clinic Number: [id number]    Therapy Diagnosis:   Encounter Diagnoses   Name Primary?    Pain in right finger(s)     Finger stiffness, right     Decreased independence with activities of daily living      Physician: Kenzie Watkins PA-C    Physician Orders: eval and treat  Medical Diagnosis: LF PIP dorsal dislocation   Date of Injury: 08//31/2019  Evaluation Date: 10/03/2019  Insurance Authorization Period Expiration: 12/31/2019  Plan of Care Certification Period: 10/03/2019-11/29/2019  Date of Return to MD: 10/22/2019     Visit # / Visits authorized: 4 / 50 *FOTO NEXT SESSION     FOTO: initial eval     Time In:445pm  Time Out:545pm  Total treatment time: 60minutes  Total Timed minutes: 45 minutes     Precautions:  Standard    Subjective     Pt reports: " I put the big splint back on they gave me. I could only stand it for 15 min but it was straighter"   he was compliant with home exercise program given last session.   Response to previous treatment:continued pain, swelling, stiffness  Functional change: none reported    Pain: 0/10  Location: right hand at rest, higher with activities    Objective        Edema. Measured in centimeters.    10/3/2019 10/3/2019 10/14/2019     Left Right Right   Long:          P1 6.7 7.5 7.5    PIP 6.7 8.0 7.9   P2 5.2 7.5 6.7    DIP 5.7 6.9 5.9            Hand ROM. Measured in degrees.  Ext measured laterally*     10/3/2019 10/3/2019 10/14/2019     Left Right Right     AROM AROM AROM            Long:  MP 95 70 94               25/60 25/65              DIP 90 7/30 5/35              CANCINO 290 128 164 (+36)                       Morteza received the following supervised modalities after being cleared for contradictions for 15 minutes:   -Fluidotherapy: To right hand , continuous air, 115 deg, air speed 50 to decrease pain, edema & scar tissue, sensory re- education, and increased tissue " extensibility prior to therex              Morteza received the following manual therapy techniques for 10 minutes:   -Performed RM to right index finger to decrease edema, improve joint mobility, decrease pain and improve lymphatic drainage to increase hand function.   -use of iastm tool for deep tissue massage to volar flexors  -cross friction massage to palmar flexor about A1 pulley  - gentle grade II joint glides to PIP  - intrinsic stretches to PIP, DIP        Morteza received therapeutic exercises for 35 minutes including:  -  AROM   DIP blocking  PIP blocking    X 10 reps each    Isospheres X 3 min   Towel scrunches X 3 min   Rice gross grasp X 3 min each   Med pom pom p/u LF to palm x 3 containers   Med pom pom hook X 3 containers     Velcro board pushes X 10 passes   Digit abd/add X 20 with blue foam/Yellow rubber band             Home Exercises and Education Provided     Education provided: cont HEP, continue milla strap, LMB size C on 30 min 2x day, static orthosis continuous except for exercises 4-6 x day and LMB 2 x day  - Progress towards goals     Written Home Exercises Provided: Patient instructed to cont prior HEP.  Exercises were reviewed and Morteza was able to demonstrate them prior to the end of the session.  Morteza demonstrated good  understanding of the education provided.   .   See EMR under Patient Instructions for exercises provided initial eval.     Assessment    Pt. Reports even with the LMB stretched out to it's max that it isn't enough stretch. Pt. May benefit from a Jess-splint PIP extension splint. Pt. To begin wearing static extension orthosis continuously with removal for exercises and LMB stretch.  Pt. Reports soreness over lateral ligaments, especially on ulnar side.  Pt. Reports continued swelling and pain and limitations to daily use. Pt. Participated with slight pain increase with exercises this date. Pt. Reports pain over the DIP dorsally.      Morteza is progressing well  towards his goals and there are no updates to goals at this time. Pt prognosis continues as Excellent. Pt will continue to benefit from skilled outpatient occupational therapy to address the deficits listed in the problem list on initial evaluation, provide pt/family education and to maximize pt's level of independence in the home and community environment.     Anticipated barriers to continued occupational therapy: high grade injury    Pt's spiritual, cultural and educational needs considered and pt agreeable to plan of care and goals.    Goals      Goals:   The following goals were discussed with the patient and patient is in agreement with them as to be addressed in the treatment plan.   Long Term Goals (LTGs); to be met by discharge.  LTG #1: Pt will report a pain level of 0 out of 10 with full gripping   -progressing    LTG #2: Pt will demo improved FOTO score by 20 points.  -progressing  LTG #3: Pt will return to prior level of function for ADLs and household management.  -progressing     Short Term Goals (STGs); to be met within 4 weeks (11/01/2019).  STG #1a: Pt will report 2 out of 10 pain level with full active range of motion.. -progressing  STG #2a: Pt will report/demo [place] with tying grocery bags for diaper disposal -progressing  STG #2b: Pt will report/demo [place] with using fork and knife for feeding. -progressing  STG #3a: Pt will demonstrate independence with issued HEP.  -progressing  STG #3b: Pt will demo improved Right LF CANCINO by 50  degrees needed to aid with lifting and carrying young children. -progressing       Plan     Continue skilled occupational therapy with individualized plan of care focusing on increasing AROM and strength required for ADLs      Updates/Grading for next session: cont to progress as able    Sheba Hall OTR/L,CHT

## 2019-10-21 ENCOUNTER — CLINICAL SUPPORT (OUTPATIENT)
Dept: REHABILITATION | Facility: HOSPITAL | Age: 44
End: 2019-10-21
Payer: COMMERCIAL

## 2019-10-21 DIAGNOSIS — Z78.9 DECREASED INDEPENDENCE WITH ACTIVITIES OF DAILY LIVING: ICD-10-CM

## 2019-10-21 DIAGNOSIS — M25.641 FINGER STIFFNESS, RIGHT: ICD-10-CM

## 2019-10-21 DIAGNOSIS — M79.644 PAIN IN RIGHT FINGER(S): ICD-10-CM

## 2019-10-21 PROCEDURE — 97110 THERAPEUTIC EXERCISES: CPT | Mod: PN

## 2019-10-21 PROCEDURE — 97022 WHIRLPOOL THERAPY: CPT | Mod: PN

## 2019-10-21 PROCEDURE — 97140 MANUAL THERAPY 1/> REGIONS: CPT | Mod: PN

## 2019-10-21 NOTE — PROGRESS NOTES
"  Occupational Therapy Daily Treatment Note      Date: 10/21/2019  Name: Morteza Cerrato  Clinic Number: [id number]    Therapy Diagnosis:   Encounter Diagnoses   Name Primary?    Pain in right finger(s)     Finger stiffness, right     Decreased independence with activities of daily living      Physician: Kenzie Watkins PA-C    Physician Orders: eval and treat  Medical Diagnosis: LF PIP dorsal dislocation   Date of Injury: 08//31/2019  Evaluation Date: 10/03/2019  Insurance Authorization Period Expiration: 12/31/2019  Plan of Care Certification Period: 10/03/2019-11/29/2019  Date of Return to MD: 10/22/2019     Visit # / Visits authorized: 5 / 50  *FOTO NEXT VISIT     FOTO: initial eval     Time In:350pm  Time Out:450pm  Total treatment time: 60minutes  Total Timed minutes: 45 minutes     Precautions:  Standard    Subjective     Pt reports: " I think this splint is helping. "   he was compliant with home exercise program given last session.   Response to previous treatment:continued pain, swelling, stiffness  Functional change: none reported    Pain: 0/10  Location: right hand at rest, higher with activities    Objective        Edema. Measured in centimeters.    10/3/2019 10/3/2019 10/14/2019     Left Right Right   Long:          P1 6.7 7.5 7.5    PIP 6.7 8.0 7.9   P2 5.2 7.5 6.7    DIP 5.7 6.9 5.9            Hand ROM. Measured in degrees.  Ext measured laterally*     10/3/2019 10/3/2019 10/14/2019 10/21/2019     Left Right Right Right     AROM AROM AROM AROM             Long:  MP 95 70 94 95               25/60 25/65 20/60              DIP 90 7/30 5/35 3/40              CANCINO 290 128 164 (+36) 172 (+8)                        Morteza received the following supervised modalities after being cleared for contradictions for 15 minutes:   -Fluidotherapy: To right hand , continuous air, 115 deg, air speed 50 to decrease pain, edema & scar tissue, sensory re- education, and increased tissue extensibility prior to " jann Pro received the following manual therapy techniques for 10 minutes:   -Performed RM to right index finger to decrease edema, improve joint mobility, decrease pain and improve lymphatic drainage to increase hand function.   -use of iastm tool for deep tissue massage to volar flexors  -cross friction massage to palmar flexor about A1 pulley  - gentle grade II joint glides to PIP  - intrinsic stretches to PIP, DIP        Morteza received therapeutic exercises for 35 minutes including:  -  AROM   DIP blocking  PIP blocking    X 10 reps each    Isospheres X 3 min   Towel scrunches X 3 min   Rice gross grasp X 3 min each   Small pom pom p/u LF to palm x 3 containers   Med pom pom hook X 3 containers     Yellow Putty X 10 squeezes  X 5 raking/reverse raking   Digit abd/add X 20 with blue foam/Yellow rubber band             Home Exercises and Education Provided     Education provided: cont HEP, continue milla strap, LMB size C on 30 min 2x day, static orthosis continuous except for exercises 4-6 x day and LMB 2 x day  - Progress towards goals     Written Home Exercises Provided: Patient instructed to cont prior HEP.  Exercises were reviewed and Morteza was able to demonstrate them prior to the end of the session.  Morteza demonstrated good  understanding of the education provided.   .   See EMR under Patient Instructions for exercises provided initial eval.     Assessment    Pt. Reports he feels the static extension splint is helping. He gained 5 degrees of extension this week at the PIP. Flexion is about the same. Pt. Reports even with the LMB stretched out to it's max that it isn't enough stretch. Pt. May benefit from a Jess-splint PIP extension splint. Pt. To begin wearing static extension orthosis continuously with removal for exercises and LMB stretch.  Pt. Reports soreness over lateral ligaments, especially on ulnar side.  Pt. Reports continued swelling and pain and limitations to daily use.  Pt. Participated with slight pain increase with exercises this date. Pt. Reports pain over the DIP dorsally. Added yellow theraputty minimum resistance exercises this date with pian increase but pt able to perform.       Morteza is progressing well towards his goals and there are no updates to goals at this time. Pt prognosis continues as Excellent. Pt will continue to benefit from skilled outpatient occupational therapy to address the deficits listed in the problem list on initial evaluation, provide pt/family education and to maximize pt's level of independence in the home and community environment.     Anticipated barriers to continued occupational therapy: high grade injury    Pt's spiritual, cultural and educational needs considered and pt agreeable to plan of care and goals.    Goals      Goals:   The following goals were discussed with the patient and patient is in agreement with them as to be addressed in the treatment plan.   Long Term Goals (LTGs); to be met by discharge.  LTG #1: Pt will report a pain level of 0 out of 10 with full gripping   -progressing    LTG #2: Pt will demo improved FOTO score by 20 points.  -progressing  LTG #3: Pt will return to prior level of function for ADLs and household management.  -progressing     Short Term Goals (STGs); to be met within 4 weeks (11/01/2019).  STG #1a: Pt will report 2 out of 10 pain level with full active range of motion.. -progressing  STG #2a: Pt will report/demo [place] with tying grocery bags for diaper disposal -progressing  STG #2b: Pt will report/demo [place] with using fork and knife for feeding. -progressing  STG #3a: Pt will demonstrate independence with issued HEP.  -progressing  STG #3b: Pt will demo improved Right LF CANCINO by 50  degrees needed to aid with lifting and carrying young children. -progressing       Plan     Continue skilled occupational therapy with individualized plan of care focusing on increasing AROM and strength  required for ADLs      Updates/Grading for next session: cont to progress as able    Sheba Hall, OTR/L,CHT

## 2019-10-23 ENCOUNTER — OFFICE VISIT (OUTPATIENT)
Dept: ORTHOPEDICS | Facility: CLINIC | Age: 44
End: 2019-10-23
Payer: COMMERCIAL

## 2019-10-23 ENCOUNTER — CLINICAL SUPPORT (OUTPATIENT)
Dept: REHABILITATION | Facility: HOSPITAL | Age: 44
End: 2019-10-23
Payer: COMMERCIAL

## 2019-10-23 VITALS
WEIGHT: 230 LBS | DIASTOLIC BLOOD PRESSURE: 90 MMHG | TEMPERATURE: 98 F | SYSTOLIC BLOOD PRESSURE: 133 MMHG | HEIGHT: 77 IN | HEART RATE: 82 BPM | BODY MASS INDEX: 27.16 KG/M2

## 2019-10-23 DIAGNOSIS — M79.644 PAIN IN RIGHT FINGER(S): ICD-10-CM

## 2019-10-23 DIAGNOSIS — Z78.9 DECREASED INDEPENDENCE WITH ACTIVITIES OF DAILY LIVING: ICD-10-CM

## 2019-10-23 DIAGNOSIS — M25.641 FINGER STIFFNESS, RIGHT: ICD-10-CM

## 2019-10-23 DIAGNOSIS — S63.259D DISLOCATION OF FINGER, SUBSEQUENT ENCOUNTER: Primary | ICD-10-CM

## 2019-10-23 PROCEDURE — 97022 WHIRLPOOL THERAPY: CPT | Mod: PN

## 2019-10-23 PROCEDURE — 99212 PR OFFICE/OUTPT VISIT, EST, LEVL II, 10-19 MIN: ICD-10-PCS | Mod: S$GLB,,, | Performed by: ORTHOPAEDIC SURGERY

## 2019-10-23 PROCEDURE — 99999 PR PBB SHADOW E&M-EST. PATIENT-LVL III: ICD-10-PCS | Mod: PBBFAC,,, | Performed by: ORTHOPAEDIC SURGERY

## 2019-10-23 PROCEDURE — 97140 MANUAL THERAPY 1/> REGIONS: CPT | Mod: PN

## 2019-10-23 PROCEDURE — 97035 APP MDLTY 1+ULTRASOUND EA 15: CPT | Mod: PN

## 2019-10-23 PROCEDURE — 99999 PR PBB SHADOW E&M-EST. PATIENT-LVL III: CPT | Mod: PBBFAC,,, | Performed by: ORTHOPAEDIC SURGERY

## 2019-10-23 PROCEDURE — 99212 OFFICE O/P EST SF 10 MIN: CPT | Mod: S$GLB,,, | Performed by: ORTHOPAEDIC SURGERY

## 2019-10-23 PROCEDURE — 97110 THERAPEUTIC EXERCISES: CPT | Mod: PN

## 2019-10-23 NOTE — PROGRESS NOTES
"Subjective:      Patient ID: Morteza Cerrato is a 44 y.o. male.    Chief Complaint: Finger Pain (right index ) and Follow-up      HPI: Morteza Cerrato is here in follow-up of a right index finger dislocation.  Patient is about 2 months out from initial injury. He began care with our clinic about 1 month out of initial injury. He is currently in occupational therapy and is currently in extension brace full time expect for HEP. He has noted increase in stiffness and significant decreased range of motion since our last visit. Patient does not have any pain at rest but does have pain with therapeutic activities.  Continues to have swelling. Using over-the-counter Advil and topical Pennsaid.     No past medical history on file.    Current Outpatient Medications:     diclofenac sodium (PENNSAID) 20 mg/gram /actuation(2 %) sopm, Apply 40 mg topically 2 (two) times daily., Disp: 1 Bottle, Rfl: 1    ketorolac (TORADOL) 10 mg tablet, Take 1 tablet (10 mg total) by mouth every 6 (six) hours as needed for Pain., Disp: 28 tablet, Rfl: 0    acetaminophen (TYLENOL) 500 MG tablet, Take 1 tablet (500 mg total) by mouth every 6 (six) hours as needed for Pain. (Patient not taking: Reported on 10/23/2019), Disp: 28 tablet, Rfl: 0  Review of patient's allergies indicates:  No Known Allergies    BP (!) 133/90   Pulse 82   Temp 98.4 °F (36.9 °C) (Oral)   Ht 6' 5" (1.956 m)   Wt 104.3 kg (230 lb)   BMI 27.27 kg/m²     Review of Systems   Constitution: Negative for chills and fever.   Cardiovascular: Negative for chest pain and palpitations.   Respiratory: Negative for shortness of breath and wheezing.    Skin: Negative for poor wound healing and rash.   Musculoskeletal: Positive for joint pain, joint swelling and stiffness.   Gastrointestinal: Negative for nausea and vomiting.   Genitourinary: Negative for dysuria and hematuria.   Neurological: Negative for numbness, paresthesias, seizures and tremors.   Psychiatric/Behavioral: " Negative for altered mental status.   Allergic/Immunologic: Negative for environmental allergies and persistent infections.         Objective:    Ortho Exam       Right upper extremity:  Significant for swelling of the entire 3rd finger and palm of the hand.  Swelling most significant at the PIP joint of the index finger. Tenderness to palpation circumferentially at the PIP joint. Range of motion severely limited.  Patient has about 30° flexure contraction.  No significant instability.  Sensation intact.  Pulses present.  Cap refill brisk    Assessment:     Imaging:  No new imaging.  Previous images of post reduction images were reviewed and negative for any fracture.        1. Dislocation of finger, subsequent encounter          Plan:       Continue occupational hand therapy.  Continue splinting.  Anticipate Jess splint.  Concerned about volar plate injury, follow-up with Dr. Flores in 1 week to discuss further treatment plan including surgery.  Continue over-the-counter anti-inflammatories and topical anti-inflammatory.       Follow up in about 1 week (around 10/30/2019).

## 2019-10-23 NOTE — PROGRESS NOTES
"  Occupational Therapy Daily Treatment Note      Date: 10/23/2019  Name: Morteza Cerrato  Clinic Number: [id number]    Therapy Diagnosis:   Encounter Diagnoses   Name Primary?    Pain in right finger(s)     Finger stiffness, right     Decreased independence with activities of daily living      Physician: Kenzie Watkins PA-C    Physician Orders: eval and treat  Medical Diagnosis: LF PIP dorsal dislocation   Date of Injury: 08//31/2019  Evaluation Date: 10/03/2019  Insurance Authorization Period Expiration: 12/31/2019  Plan of Care Certification Period: 10/03/2019-11/29/2019  Date of Return to MD: 10/22/2019     Visit # / Visits authorized: 5 / 50       FOTO: initial eval, 6th visit     Time In:350pm  Time Out:450pm  Total treatment time: 60minutes  Total Timed minutes: 45 minutes     Precautions:  Standard    Subjective     Pt reports: " The PA wants me to meet with the surgeon. She said she thinks I ruptured the volar plate. "   he was compliant with home exercise program given last session.   Response to previous treatment:continued pain, swelling, stiffness  Functional change: none reported    Pain: 0/10  Location: right hand at rest, higher with activities    Objective        Edema. Measured in centimeters.    10/3/2019 10/3/2019 10/14/2019     Left Right Right   Long:          P1 6.7 7.5 7.5    PIP 6.7 8.0 7.9   P2 5.2 7.5 6.7    DIP 5.7 6.9 5.9            Hand ROM. Measured in degrees.  Ext measured laterally*     10/3/2019 10/3/2019 10/14/2019 10/21/2019     Left Right Right Right     AROM AROM AROM AROM             Long:  MP 95 70 94 95               25/60 25/65 20/60              DIP 90 7/30 5/35 3/40              CANCINO 290 128 164 (+36) 172 (+8)                        Morteza received the following supervised modalities after being cleared for contradictions for 15 minutes:   -Fluidotherapy: To right hand , continuous air, 115 deg, air speed 50 to decrease pain, edema & scar tissue, sensory re- " education, and increased tissue extensibility prior to therex         Morteza received the following direct modalities after being cleared for contradictions for 8 minutes:Patient received ultrasound to volar long finger and palm area to increase blood flow, circulation, tissue elasticity, pain management  for 8 minutes @ 3.3 Mhz, Intensity 0.8 w/cm2 at 100% duty cycle.  US accompanied by stretch into extension during treatment.           Morteza received the following manual therapy techniques for 10 minutes:   -Performed RM to right index finger to decrease edema, improve joint mobility, decrease pain and improve lymphatic drainage to increase hand function.   -use of Thomas-Krenn tool for deep tissue massage to volar flexors  -cross friction massage to palmar flexor about A1 pulley  - gentle grade II joint glides to PIP  - intrinsic stretches to PIP, DIP        Morteza received therapeutic exercises for 27 minutes including:  -  AROM   DIP blocking  PIP blocking    X 10 reps each    Isospheres X 3 min   Towel scrunches X 3 min   Rice gross grasp NOT PERFORMED THIS TREATMENT  X 3 min each   Med pom pom p/u LF to palm x 3 containers   Med pom pom hook X 3 containers     Yellow Putty NOT PERFORMED THIS TREATMENT  X 10 squeezes  X 5 raking/reverse raking   Digit abd/add X 20 with blue foam/Yellow rubber band             Home Exercises and Education Provided     Education provided: cont HEP, continue milla strap, LMB size C on 30 min 2x day, static orthosis continuous except for exercises 4-6 x day and LMB 2 x day  - Progress towards goals     Written Home Exercises Provided: Patient instructed to cont prior HEP.  Exercises were reviewed and Morteza was able to demonstrate them prior to the end of the session.  Morteza demonstrated good  understanding of the education provided.   .   See EMR under Patient Instructions for exercises provided initial eval.     Assessment    Pt. Reports he is supposed to see the surgeon. He is  having increased pain this date. Pt. Reports he feels the static extension orthosis is helping. He gained 5 degrees of extension this week at the PIP. Flexion is about the same. Pt. Reports even with the LMB stretched out to it's max that it isn't enough stretch. Pt. May benefit from a Jess-splint PIP extension splint. .  Pt. Reports soreness over lateral ligaments, especially on ulnar side, pain over the DIP dorsally, pain about volar MP head/palm, and pain at P1 volarly. Pt. With limitations in passive and active extension and flexion, however can achieve > passive extension than active extension.  Pt. Presents with symptoms consistent with volar plate adherence. Pt. Reports continued swelling and pain and limitations to daily use. Pt. Participated with slight pain increase with exercises this date. Pt. Reports pain  Attempted use of US for deep heat and stretch without much change reported per pt. Pt. With increased pain with flexion this date and inability to  the smaller pom poms this date. Added coban to finger prior to orthosis application to control edema.       Morteza is progressing well towards his goals and there are no updates to goals at this time. Pt prognosis continues as Excellent. Pt will continue to benefit from skilled outpatient occupational therapy to address the deficits listed in the problem list on initial evaluation, provide pt/family education and to maximize pt's level of independence in the home and community environment.     Anticipated barriers to continued occupational therapy: high grade injury    Pt's spiritual, cultural and educational needs considered and pt agreeable to plan of care and goals.    Goals      Goals:   The following goals were discussed with the patient and patient is in agreement with them as to be addressed in the treatment plan.   Long Term Goals (LTGs); to be met by discharge.  LTG #1: Pt will report a pain level of 0 out of 10 with full gripping    -progressing    LTG #2: Pt will demo improved FOTO score by 20 points.  -progressing  LTG #3: Pt will return to prior level of function for ADLs and household management.  -progressing     Short Term Goals (STGs); to be met within 4 weeks (11/01/2019).  STG #1a: Pt will report 2 out of 10 pain level with full active range of motion.. -progressing  STG #2a: Pt will report/demo [place] with tying grocery bags for diaper disposal -progressing  STG #2b: Pt will report/demo [place] with using fork and knife for feeding. -progressing  STG #3a: Pt will demonstrate independence with issued HEP.  -progressing  STG #3b: Pt will demo improved Right LF CANCINO by 50  degrees needed to aid with lifting and carrying young children. -progressing       Plan     Continue skilled occupational therapy with individualized plan of care focusing on increasing AROM and strength required for ADLs      Updates/Grading for next session: cont to progress as able    Sheba Hall OTR/L,CHT

## 2019-10-28 ENCOUNTER — CLINICAL SUPPORT (OUTPATIENT)
Dept: REHABILITATION | Facility: HOSPITAL | Age: 44
End: 2019-10-28
Payer: COMMERCIAL

## 2019-10-28 DIAGNOSIS — M79.644 PAIN IN RIGHT FINGER(S): ICD-10-CM

## 2019-10-28 DIAGNOSIS — M25.641 FINGER STIFFNESS, RIGHT: ICD-10-CM

## 2019-10-28 DIAGNOSIS — Z78.9 DECREASED INDEPENDENCE WITH ACTIVITIES OF DAILY LIVING: ICD-10-CM

## 2019-10-28 PROCEDURE — 97110 THERAPEUTIC EXERCISES: CPT | Mod: PN

## 2019-10-28 PROCEDURE — 97140 MANUAL THERAPY 1/> REGIONS: CPT | Mod: PN

## 2019-10-28 PROCEDURE — 97022 WHIRLPOOL THERAPY: CPT | Mod: PN

## 2019-10-28 NOTE — PROGRESS NOTES
"  Occupational Therapy Daily Treatment Note      Date: 10/28/2019  Name: Morteza Cerrato  Clinic Number: [id number]    Therapy Diagnosis:   Encounter Diagnoses   Name Primary?    Pain in right finger(s)     Finger stiffness, right     Decreased independence with activities of daily living      Physician: Kenzie Watkins PA-C    Physician Orders: eval and treat  Medical Diagnosis: LF PIP dorsal dislocation   Date of Injury: 08//31/2019  Evaluation Date: 10/03/2019  Insurance Authorization Period Expiration: 12/31/2019  Plan of Care Certification Period: 10/03/2019-11/29/2019  Date of Return to MD: 10/22/2019     Visit # / Visits authorized: 7 / 50       FOTO: initial eval, 6th visit     Time In:350pm  Time Out:450pm  Total treatment time: 60minutes  Total Timed minutes: 45 minutes     Precautions:  Standard    Subjective     Pt reports: " I saw Dr. Darden and he wants me to keep it moving as much as I can. He also wants me to get the dynasplint "   he was compliant with home exercise program given last session.   Response to previous treatment:continued pain, swelling, stiffness  Functional change: none reported    Pain: 0/10  Location: right hand at rest, higher with activities    Objective        Edema. Measured in centimeters.    10/3/2019 10/3/2019 10/14/2019     Left Right Right   Long:          P1 6.7 7.5 7.5    PIP 6.7 8.0 7.9   P2 5.2 7.5 6.7    DIP 5.7 6.9 5.9            Hand ROM. Measured in degrees.  Ext measured laterally*     10/3/2019 10/3/2019 10/14/2019 10/21/2019     Left Right Right Right     AROM AROM AROM AROM             Long:  MP 95 70 94 95               25/60 25/65 20/60              DIP 90 7/30 5/35 3/40              CANCINO 290 128 164 (+36) 172 (+8)                        Morteza received the following supervised modalities after being cleared for contradictions for 15 minutes:   -Fluidotherapy: To right hand , continuous air, 115 deg, air speed 50 to decrease pain, edema & scar " tissue, sensory re- education, and increased tissue extensibility prior to therex              Morteza received the following manual therapy techniques for 15 minutes:   -Performed RM to right index finger to decrease edema, improve joint mobility, decrease pain and improve lymphatic drainage to increase hand function.   -use of iastm tool for deep tissue massage to volar flexors  -cross friction massage to palmar flexor about A1 pulley  - gentle grade II joint glides to PIP  - intrinsic stretches to PIP, DIP        Morteza received therapeutic exercises for 30 minutes including:  -  AROM   DIP blocking  PIP blocking  TGE  FDS isolated    X 10 reps each    Isospheres X 3 min   Gross grasp med/small dowel rods X 2 min each    Rice gross grasp NOT PERFORMED THIS TREATMENT  X 3 min each   small pom pom p/u LF to palm x 3 containers   Med pom pom hook X 3 containers     Yellow Putty NOT PERFORMED THIS TREATMENT  X 10 squeezes  X 5 raking/reverse raking   Digit abd/add X 20 with blue foam/Yellow rubber band             Home Exercises and Education Provided     Education provided: cont HEP, continue milla strap, LMB size C on 30 min 2x day, static orthosis at night per MD and LMB 2 x day  - Progress towards goals     Written Home Exercises Provided: Patient instructed to cont prior HEP.  Exercises were reviewed and Morteza was able to demonstrate them prior to the end of the session.  Morteza demonstrated good  understanding of the education provided.   .   See EMR under Patient Instructions for exercises provided initial eval.     Assessment    Pt. Reports he saw another surgeon who would like him to really push to get the finger moving. He recommended the dynasplint. The pt states he feels the surgeon wanted the dynasplint for flexion but he is not sure. Pt. Reports he will return to see Dr. Darden in 6 weeks and determine need for sx. Pt. Reports soreness over lateral ligaments, especially on ulnar side, pain over  the DIP dorsally, pain about volar MP head/palm, and pain at P1 volarly. Pt. With limitations in passive and active extension and flexion, however can achieve > passive extension than active extension.  Pt. Presents with symptoms consistent with volar plate adherence. Pt. Reports continued swelling and pain and limitations to daily use. Pt. Participated with  pain increase with exercises and during manual therapy this date. The surgeon wanted him to wear the static splint to bed but otherwise use it as much as he can during the day.       Morteza is progressing well towards his goals and there are no updates to goals at this time. Pt prognosis continues as Excellent. Pt will continue to benefit from skilled outpatient occupational therapy to address the deficits listed in the problem list on initial evaluation, provide pt/family education and to maximize pt's level of independence in the home and community environment.     Anticipated barriers to continued occupational therapy: high grade injury    Pt's spiritual, cultural and educational needs considered and pt agreeable to plan of care and goals.    Goals      Goals:   The following goals were discussed with the patient and patient is in agreement with them as to be addressed in the treatment plan.   Long Term Goals (LTGs); to be met by discharge.  LTG #1: Pt will report a pain level of 0 out of 10 with full gripping   -progressing    LTG #2: Pt will demo improved FOTO score by 20 points.  -progressing  LTG #3: Pt will return to prior level of function for ADLs and household management.  -progressing     Short Term Goals (STGs); to be met within 4 weeks (11/01/2019).  STG #1a: Pt will report 2 out of 10 pain level with full active range of motion.. -progressing  STG #2a: Pt will report/demo [place] with tying grocery bags for diaper disposal -progressing  STG #2b: Pt will report/demo [place] with using fork and knife for feeding. -progressing  STG #3a:  Pt will demonstrate independence with issued HEP.  -progressing  STG #3b: Pt will demo improved Right LF CANCINO by 50  degrees needed to aid with lifting and carrying young children. -progressing       Plan     Continue skilled occupational therapy with individualized plan of care focusing on increasing AROM and strength required for ADLs      Updates/Grading for next session: cont to progress as able    Sheba Hall, OTR/L,CHT

## 2019-10-29 DIAGNOSIS — S63.252A CLOSED DISLOCATION OF RIGHT MIDDLE FINGER: Primary | ICD-10-CM

## 2019-11-01 ENCOUNTER — CLINICAL SUPPORT (OUTPATIENT)
Dept: REHABILITATION | Facility: HOSPITAL | Age: 44
End: 2019-11-01
Payer: COMMERCIAL

## 2019-11-01 DIAGNOSIS — Z78.9 DECREASED INDEPENDENCE WITH ACTIVITIES OF DAILY LIVING: ICD-10-CM

## 2019-11-01 DIAGNOSIS — M25.641 FINGER STIFFNESS, RIGHT: ICD-10-CM

## 2019-11-01 DIAGNOSIS — M79.644 PAIN IN RIGHT FINGER(S): ICD-10-CM

## 2019-11-01 PROCEDURE — 97110 THERAPEUTIC EXERCISES: CPT | Mod: PN

## 2019-11-01 PROCEDURE — 97140 MANUAL THERAPY 1/> REGIONS: CPT | Mod: PN,59

## 2019-11-01 PROCEDURE — 97022 WHIRLPOOL THERAPY: CPT | Mod: PN

## 2019-11-01 PROCEDURE — 97760 ORTHOTIC MGMT&TRAING 1ST ENC: CPT | Mod: PN

## 2019-11-01 NOTE — PROGRESS NOTES
"  Occupational Therapy Daily Treatment Note      Date: 11/1/2019  Name: Morteza Cerrato  Clinic Number: [id number]    Therapy Diagnosis:   Encounter Diagnoses   Name Primary?    Pain in right finger(s)     Finger stiffness, right     Decreased independence with activities of daily living      Physician: Kenzie Watkins PA-C    Physician Orders: eval and treat  Medical Diagnosis: LF PIP dorsal dislocation   Date of Injury: 08//31/2019  Evaluation Date: 10/03/2019  Insurance Authorization Period Expiration: 12/31/2019  Plan of Care Certification Period: 10/03/2019-11/29/2019  Date of Return to MD: 10/22/2019     Visit # / Visits authorized: 8 / 50       FOTO: initial eval, 6th visit     Time In: 10:00 am  Time Out: 11:00 am  Total treatment time: 60 minutes  Total Timed minutes: 45 minutes     Precautions:  Standard    Subjective     Pt reports: "it's feeling stiff"   he was compliant with home exercise program given last session.   Response to previous treatment:continued pain, swelling, stiffness  Functional change: none reported    Pain: 0/10  Location: right hand at rest, higher with activities    Objective        Edema. Measured in centimeters.    10/3/2019 10/3/2019 10/14/2019     Left Right Right   Long:          P1 6.7 7.5 7.5    PIP 6.7 8.0 7.9   P2 5.2 7.5 6.7    DIP 5.7 6.9 5.9            Hand ROM. Measured in degrees.  Ext measured laterally*     10/3/2019 10/3/2019 10/14/2019 10/21/2019 11/1/19     Left Right Right Right Right     AROM AROM AROM AROM AROM           *pre heat   Long:  MP 95 70 94 95 100               25/60 25/65 20/60 45/65              DIP 90 7/30 5/35 3/40 12/35              CANCINO 290 128 164 (+36) 172 (+8) 143                         Morteza received the following supervised modalities after being cleared for contradictions for 15 minutes:   -Fluidotherapy: To right hand , continuous air, 115 deg, air speed 50 to decrease pain, edema & scar tissue, sensory re- education, and " increased tissue extensibility prior to therex              Morteza received the following manual therapy techniques for 15 minutes:   -Performed RM to right index finger to decrease edema, improve joint mobility, decrease pain and improve lymphatic drainage to increase hand function.   -use of iastm tool for deep tissue massage to volar flexors  -cross friction massage to palmar flexor about A1 pulley  - gentle grade II joint glides to PIP  - intrinsic stretches to PIP, DIP        Morteza received therapeutic exercises for 15 minutes including:  -  AROM   DIP blocking  PIP blocking  TGE  FDS isolated    X 10 reps each    Isospheres X 3 min   Gross grasp med/small dowel rods X 2 min each    Rice gross grasp NOT PERFORMED THIS TREATMENT  X 3 min each   small pom pom p/u LF to palm x 3 containers   Med pom pom hook X 3 containers     Yellow Putty NOT PERFORMED THIS TREATMENT  X 10 squeezes  X 5 raking/reverse raking   Digit abd/add NOT PERFORMED THIS TREATMENT  X 20 with blue foam/Yellow rubber band       Fabricated a custom [place] orthosis for joint blocking for PIP flex and alternating for PIP extension during exercises only.  Instructed to monitor for pain/pressure, increased edema, or redness and to contact office for adjustments as needed. Patient reported good understanding of orthotic wear and care schedule.         Home Exercises and Education Provided     Education provided: cont HEP, continue buddy strap, LMB size C on 30 min 2x day, static orthosis at night per MD and LMB 2 x day  - Progress towards goals     Written Home Exercises Provided: Patient instructed to cont prior HEP.  Exercises were reviewed and Morteza was able to demonstrate them prior to the end of the session.  Morteza demonstrated good  understanding of the education provided.   .   See EMR under Patient Instructions for exercises provided initial eval.     Assessment   Pt's MD recommended really pushing to get the finger moving. He  recommended the dynasplint. Fabricated a custom yoke orthosis for joint blocking during exercises to increase PIP flexion and extension.  Pt cont to report pain at volar wrist, volar palm at A1 pulley, and lateral ligaments.  Pt. With limitations in passive and active extension and flexion, however can achieve more passive extension than active extension.  Pt. Cont to present with symptoms consistent with volar plate adherence. Pt. Reports continued swelling and pain and limitations to daily use. Pt. Participated with  pain increase with exercises and during manual therapy this date. The surgeon wants him to wear the static splint to bed but otherwise use it as much as he can during the day.       Morteza is progressing well towards his goals and there are no updates to goals at this time. Pt prognosis continues as Excellent. Pt will continue to benefit from skilled outpatient occupational therapy to address the deficits listed in the problem list on initial evaluation, provide pt/family education and to maximize pt's level of independence in the home and community environment.     Anticipated barriers to continued occupational therapy: high grade injury    Pt's spiritual, cultural and educational needs considered and pt agreeable to plan of care and goals.    Goals      Goals:   The following goals were discussed with the patient and patient is in agreement with them as to be addressed in the treatment plan.   Long Term Goals (LTGs); to be met by discharge.  LTG #1: Pt will report a pain level of 0 out of 10 with full gripping   -progressing    LTG #2: Pt will demo improved FOTO score by 20 points.  -progressing  LTG #3: Pt will return to prior level of function for ADLs and household management.  -progressing     Short Term Goals (STGs); to be met within 4 weeks (11/01/2019).  STG #1a: Pt will report 2 out of 10 pain level with full active range of motion.. -progressing  STG #2a: Pt will report/demo [place]  with tying grocery bags for diaper disposal -progressing  STG #2b: Pt will report/demo [place] with using fork and knife for feeding. -progressing  STG #3a: Pt will demonstrate independence with issued HEP.  -progressing  STG #3b: Pt will demo improved Right LF CANCINO by 50  degrees needed to aid with lifting and carrying young children. -progressing       Plan     Continue skilled occupational therapy with individualized plan of care focusing on increasing AROM and strength required for ADLs      Updates/Grading for next session: cont to progress as able    Violetta Giordano, OT

## 2019-11-04 ENCOUNTER — CLINICAL SUPPORT (OUTPATIENT)
Dept: REHABILITATION | Facility: HOSPITAL | Age: 44
End: 2019-11-04
Payer: COMMERCIAL

## 2019-11-04 DIAGNOSIS — M79.644 PAIN IN RIGHT FINGER(S): ICD-10-CM

## 2019-11-04 DIAGNOSIS — M25.641 FINGER STIFFNESS, RIGHT: ICD-10-CM

## 2019-11-04 DIAGNOSIS — Z78.9 DECREASED INDEPENDENCE WITH ACTIVITIES OF DAILY LIVING: ICD-10-CM

## 2019-11-04 PROCEDURE — 97110 THERAPEUTIC EXERCISES: CPT | Mod: PN

## 2019-11-04 PROCEDURE — 97022 WHIRLPOOL THERAPY: CPT | Mod: PN

## 2019-11-04 PROCEDURE — 97140 MANUAL THERAPY 1/> REGIONS: CPT | Mod: PN

## 2019-11-04 NOTE — PROGRESS NOTES
"  Occupational Therapy Daily Treatment Note      Date: 11/4/2019  Name: Morteza Cerrato  Clinic Number: [id number]    Therapy Diagnosis:   Encounter Diagnoses   Name Primary?    Pain in right finger(s)     Finger stiffness, right     Decreased independence with activities of daily living      Physician: Kenzie Watkins PA-C    Physician Orders: eval and treat  Medical Diagnosis: LF PIP dorsal dislocation   Date of Injury: 08//31/2019  Evaluation Date: 10/03/2019  Insurance Authorization Period Expiration: 12/31/2019  Plan of Care Certification Period: 10/03/2019-11/29/2019  Date of Return to MD: 10/22/2019     Visit # / Visits authorized: 9 / 50       FOTO: initial eval, 6th visit     Time In: 4pm  Time Out: 5pm  Total treatment time: 60 minutes  Total Timed minutes: 45 minutes     Precautions:  Standard    Subjective     Pt reports: " The cold weather is really making it stiff"    he was compliant with home exercise program given last session.   Response to previous treatment:continued pain, swelling, stiffness  Functional change: none reported    Pain: 0/10  Location: right hand at rest, higher with activities    Objective        Edema. Measured in centimeters.    10/3/2019 10/3/2019 10/14/2019     Left Right Right   Long:          P1 6.7 7.5 7.5    PIP 6.7 8.0 7.9   P2 5.2 7.5 6.7    DIP 5.7 6.9 5.9            Hand ROM. Measured in degrees.  Ext measured laterally*     10/3/2019 10/3/2019 10/14/2019 10/21/2019 11/1/19     Left Right Right Right Right     AROM AROM AROM AROM AROM           *pre heat   Long:  MP 95 70 94 95 100               25/60 25/65 20/60 45/65              DIP 90 7/30 5/35 3/40 12/35              CANCINO 290 128 164 (+36) 172 (+8) 143                         Morteza received the following supervised modalities after being cleared for contradictions for 15 minutes:   -Fluidotherapy: To right hand , continuous air, 115 deg, air speed 50 to decrease pain, edema & scar tissue, sensory re- " education, and increased tissue extensibility prior to therex              Morteza received the following manual therapy techniques for 15 minutes:   -Performed RM to right index finger to decrease edema, improve joint mobility, decrease pain and improve lymphatic drainage to increase hand function.   -use of iastm tool for deep tissue massage to volar flexors  -cross friction massage to palmar flexor about A1 pulley  - gentle grade II joint glides to PIP  - intrinsic stretches to PIP, DIP        Morteza received therapeutic exercises for 30 minutes including:  -  AROM   DIP blocking  PIP blocking  TGE  FDS isolated    X 10 reps each    Isospheres X 3 min   Gross grasp med/small dowel rods X 2 min each    small pom pom p/u LF to palm x 3 containers   Med pom pom hook X 3 containers     Yellow Putty   X 10 squeezes  X 5 raking/reverse raking   Digit abd/add   X 20 with blue foam/Yellow rubber band          Home Exercises and Education Provided     Education provided: cont HEP, continue milla strap, LMB size C on 30 min 2x day, static orthosis at night per MD and LMB 2 x day  - Progress towards goals     Written Home Exercises Provided: Patient instructed to cont prior HEP.  Exercises were reviewed and Morteza was able to demonstrate them prior to the end of the session.  Morteza demonstrated good  understanding of the education provided.   .   See EMR under Patient Instructions for exercises provided initial eval.     Assessment   Pt's MD recommended really pushing to get the finger moving. He recommended the dynasplint. .  Pt cont to report pain at volar wrist, volar palm at A1 pulley, and lateral ligaments.  Pt. With limitations in passive and active extension and flexion, however can achieve more passive extension than active extension.  Pt. Cont to present with symptoms consistent with volar plate adherence. Pt. Reports continued swelling and pain and limitations to daily use. Pt. Participated with  pain increase  with exercises and during manual therapy this date. The surgeon wants him to wear the static splint to bed but otherwise use it as much as he can during the day.       Morteza is progressing well towards his goals and there are no updates to goals at this time. Pt prognosis continues as Excellent. Pt will continue to benefit from skilled outpatient occupational therapy to address the deficits listed in the problem list on initial evaluation, provide pt/family education and to maximize pt's level of independence in the home and community environment.     Anticipated barriers to continued occupational therapy: high grade injury    Pt's spiritual, cultural and educational needs considered and pt agreeable to plan of care and goals.    Goals      Goals:   The following goals were discussed with the patient and patient is in agreement with them as to be addressed in the treatment plan.   Long Term Goals (LTGs); to be met by discharge.  LTG #1: Pt will report a pain level of 0 out of 10 with full gripping   -progressing    LTG #2: Pt will demo improved FOTO score by 20 points.  -progressing  LTG #3: Pt will return to prior level of function for ADLs and household management.  -progressing     Short Term Goals (STGs); to be met within 4 weeks (11/01/2019).  STG #1a: Pt will report 2 out of 10 pain level with full active range of motion.. -progressing  STG #2a: Pt will report/demo [place] with tying grocery bags for diaper disposal -progressing  STG #2b: Pt will report/demo [place] with using fork and knife for feeding. -progressing  STG #3a: Pt will demonstrate independence with issued HEP.  -progressing  STG #3b: Pt will demo improved Right LF CANCINO by 50  degrees needed to aid with lifting and carrying young children. -progressing       Plan     Continue skilled occupational therapy with individualized plan of care focusing on increasing AROM and strength required for ADLs      Updates/Grading for next session:  cont to progress as able    Sheba Hall, OTR/L,CHT

## 2019-11-06 ENCOUNTER — CLINICAL SUPPORT (OUTPATIENT)
Dept: REHABILITATION | Facility: HOSPITAL | Age: 44
End: 2019-11-06
Payer: COMMERCIAL

## 2019-11-06 DIAGNOSIS — M79.644 PAIN IN RIGHT FINGER(S): ICD-10-CM

## 2019-11-06 DIAGNOSIS — M25.641 FINGER STIFFNESS, RIGHT: ICD-10-CM

## 2019-11-06 DIAGNOSIS — Z78.9 DECREASED INDEPENDENCE WITH ACTIVITIES OF DAILY LIVING: ICD-10-CM

## 2019-11-06 PROCEDURE — 97110 THERAPEUTIC EXERCISES: CPT | Mod: PN

## 2019-11-06 PROCEDURE — 97022 WHIRLPOOL THERAPY: CPT | Mod: PN

## 2019-11-06 PROCEDURE — 97140 MANUAL THERAPY 1/> REGIONS: CPT | Mod: PN

## 2019-11-06 NOTE — PROGRESS NOTES
"  Occupational Therapy Daily Treatment Note      Date: 11/6/2019  Name: Morteza Cerrato  Clinic Number: [id number]    Therapy Diagnosis:   Encounter Diagnoses   Name Primary?    Pain in right finger(s)     Finger stiffness, right     Decreased independence with activities of daily living      Physician: Kenzie Watkins PA-C    Physician Orders: eval and treat  Medical Diagnosis: LF PIP dorsal dislocation   Date of Injury: 08//31/2019  Evaluation Date: 10/03/2019  Insurance Authorization Period Expiration: 12/31/2019  Plan of Care Certification Period: 10/03/2019-11/29/2019  Date of Return to MD: 10/22/2019     Visit # / Visits authorized: 10 / 50   *FOTO NEXT VISIT*     FOTO: initial eval, 6th visit     Time In: 4pm  Time Out: 5pm  Total treatment time: 60 minutes  Total Timed minutes: 45 minutes     Precautions:  Standard    Subjective     Pt reports: " I got the dynasplint for extension. The one for flexion is coming. I was able to wear it for a little more than 30 min. He wants me to wear it for 2 hours. I can really feel it."     he was compliant with home exercise program given last session.   Response to previous treatment:continued pain, swelling, stiffness  Functional change: none reported    Pain: 0/10  Location: right hand at rest, higher with activities    Objective        Edema. Measured in centimeters.    10/3/2019 10/3/2019 10/14/2019     Left Right Right   Long:          P1 6.7 7.5 7.5    PIP 6.7 8.0 7.9   P2 5.2 7.5 6.7    DIP 5.7 6.9 5.9            Hand ROM. Measured in degrees.  Ext measured laterally*     10/3/2019 10/3/2019 10/14/2019 10/21/2019 11/1/19     Left Right Right Right Right     AROM AROM AROM AROM AROM           *pre heat   Long:  MP 95 70 94 95 100               25/60 25/65 20/60 45/65              DIP 90 7/30 5/35 3/40 12/35              CANCINO 290 128 164 (+36) 172 (+8) 143                         Morteza received the following supervised modalities after being cleared for " contradictions for 15 minutes:   -Fluidotherapy: To right hand , continuous air, 115 deg, air speed 50 to decrease pain, edema & scar tissue, sensory re- education, and increased tissue extensibility prior to therex              Morteza received the following manual therapy techniques for 15 minutes:   -Performed RM to right index finger to decrease edema, improve joint mobility, decrease pain and improve lymphatic drainage to increase hand function.   -use of iastm tool for deep tissue massage to volar flexors  -cross friction massage to palmar flexor about A1 pulley  - gentle grade II joint glides to PIP  - intrinsic stretches to PIP, DIP        Morteza received therapeutic exercises for 30 minutes including:  -  AROM   DIP blocking  PIP blocking  TGE  FDS isolated    X 10 reps each    Isospheres X 3 min   small pom pom p/u LF to palm x 3 containers   Med pom pom hook X 3 containers     Yellow Putty   X 10 squeezes  X 10 raking/reverse raking  X 10 isolated flexion   Digit abd/add   X 20 with blue foam/Yellow rubber band          Home Exercises and Education Provided     Education provided: cont HEP, continue milla strap, LMB size C on 30 min 2x day, static orthosis at night per MD, Dynasplint as instructed  - Progress towards goals     Written Home Exercises Provided: Patient instructed to cont prior HEP.  Exercises were reviewed and Morteza was able to demonstrate them prior to the end of the session.  Morteza demonstrated good  understanding of the education provided.   .   See EMR under Patient Instructions for exercises provided initial eval.     Assessment   Pt's MD recommended really pushing to get the finger moving. He recommended the dynasplint. .  Pt cont to report pain at volar wrist, volar palm at A1 pulley, and lateral ligaments.  Pt. With limitations in passive and active extension and flexion, however can achieve more passive extension than active extension.  Pt. Cont to present with symptoms  consistent with volar plate adherence. Pt. Reports continued swelling and pain and limitations to daily use. Pt. Participated with  pain increase with exercises and during manual therapy this date. He is to continue wearing the DynaSplint as instructed- He left session this date with the DynaSplint extension orthosis on.       Morteza is progressing well towards his goals and there are no updates to goals at this time. Pt prognosis continues as Excellent. Pt will continue to benefit from skilled outpatient occupational therapy to address the deficits listed in the problem list on initial evaluation, provide pt/family education and to maximize pt's level of independence in the home and community environment.     Anticipated barriers to continued occupational therapy: high grade injury    Pt's spiritual, cultural and educational needs considered and pt agreeable to plan of care and goals.    Goals      Goals:   The following goals were discussed with the patient and patient is in agreement with them as to be addressed in the treatment plan.   Long Term Goals (LTGs); to be met by discharge.  LTG #1: Pt will report a pain level of 0 out of 10 with full gripping   -progressing    LTG #2: Pt will demo improved FOTO score by 20 points.  -progressing  LTG #3: Pt will return to prior level of function for ADLs and household management.  -progressing     Short Term Goals (STGs); to be met within 4 weeks (11/01/2019).  STG #1a: Pt will report 2 out of 10 pain level with full active range of motion.. -progressing  STG #2a: Pt will report/demo [place] with tying grocery bags for diaper disposal -progressing  STG #2b: Pt will report/demo [place] with using fork and knife for feeding. -progressing  STG #3a: Pt will demonstrate independence with issued HEP.  -progressing  STG #3b: Pt will demo improved Right LF CANCINO by 50  degrees needed to aid with lifting and carrying young children. -progressing       Plan     Continue  skilled occupational therapy with individualized plan of care focusing on increasing AROM and strength required for ADLs      Updates/Grading for next session: cont to progress as able    Sheba Hall, OTR/L,CHT

## 2019-11-11 ENCOUNTER — CLINICAL SUPPORT (OUTPATIENT)
Dept: REHABILITATION | Facility: HOSPITAL | Age: 44
End: 2019-11-11
Payer: COMMERCIAL

## 2019-11-11 DIAGNOSIS — M25.641 FINGER STIFFNESS, RIGHT: ICD-10-CM

## 2019-11-11 DIAGNOSIS — M79.644 PAIN IN RIGHT FINGER(S): ICD-10-CM

## 2019-11-11 DIAGNOSIS — Z78.9 DECREASED INDEPENDENCE WITH ACTIVITIES OF DAILY LIVING: ICD-10-CM

## 2019-11-11 PROCEDURE — 97110 THERAPEUTIC EXERCISES: CPT | Mod: PN

## 2019-11-11 PROCEDURE — 97140 MANUAL THERAPY 1/> REGIONS: CPT | Mod: PN

## 2019-11-11 PROCEDURE — 97022 WHIRLPOOL THERAPY: CPT | Mod: PN

## 2019-11-11 NOTE — PROGRESS NOTES
"  Occupational Therapy Daily Treatment Note      Date: 11/11/2019  Name: Morteza Cerrato  Clinic Number: [id number]    Therapy Diagnosis:   Encounter Diagnoses   Name Primary?    Pain in right finger(s)     Finger stiffness, right     Decreased independence with activities of daily living      Physician: Kenzie Watkins PA-C    Physician Orders: eval and treat  Medical Diagnosis: LF PIP dorsal dislocation   Date of Injury: 08//31/2019  Evaluation Date: 10/03/2019  Insurance Authorization Period Expiration: 12/31/2019  Plan of Care Certification Period: 10/03/2019-11/29/2019  Date of Return to MD: 10/22/2019     Visit # / Visits authorized: 10 / 50       FOTO: initial eval, 6th visit     Time In: 4pm  Time Out: 5pm  Total treatment time: 60 minutes  Total Timed minutes: 45 minutes     Precautions:  Standard    Subjective     Pt reports: " I got the dynasplint for extension. The one for flexion is coming. I was able to wear it for a little more than 30 min. He wants me to wear it for 2 hours. I can really feel it."     he was compliant with home exercise program given last session.   Response to previous treatment:continued pain, swelling, stiffness  Functional change: none reported    Pain: 0/10  Location: right hand at rest, higher with activities    Objective        Edema. Measured in centimeters.    10/3/2019 10/3/2019 10/14/2019     Left Right Right   Long:          P1 6.7 7.5 7.5    PIP 6.7 8.0 7.9   P2 5.2 7.5 6.7    DIP 5.7 6.9 5.9            Hand ROM. Measured in degrees.  Ext measured laterally*     10/3/2019 10/3/2019 10/14/2019 10/21/2019 11/1/19     Left Right Right Right Right     AROM AROM AROM AROM AROM           *pre heat   Long:  MP 95 70 94 95 100               25/60 25/65 20/60 45/65              DIP 90 7/30 5/35 3/40 12/35              CANCINO 290 128 164 (+36) 172 (+8) 143                         Morteza received the following supervised modalities after being cleared for contradictions " for 15 minutes:   -Fluidotherapy: To right hand , continuous air, 115 deg, air speed 50 to decrease pain, edema & scar tissue, sensory re- education, and increased tissue extensibility prior to therex              Morteza received the following manual therapy techniques for 15 minutes:   -Performed RM to right index finger to decrease edema, improve joint mobility, decrease pain and improve lymphatic drainage to increase hand function.   -use of iastm tool for deep tissue massage to volar flexors  -cross friction massage to palmar flexor about A1 pulley  - gentle grade II joint glides to PIP  - intrinsic stretches to PIP, DIP        Morteza received therapeutic exercises for 30 minutes including:  -  AROM   DIP blocking  PIP blocking  TGE  FDS isolated    X 10 reps each    Isospheres X 3 min   small pom pom p/u LF to palm x 3 containers   Med pom pom hook X 3 containers     Yellow Putty   X 10 squeezes  X 10 raking/reverse raking  X 10 isolated flexion   Digit abd/add   X 20 with blue foam/Yellow rubber band          Home Exercises and Education Provided     Education provided: cont HEP, continue milla strap, LMB size C on 30 min 2x day, static orthosis at night per [place] as instructed  - Progress towards goals     Written Home Exercises Provided: Patient instructed to cont prior HEP.  Exercises were reviewed and Morteza was able to demonstrate them prior to the end of the session.  Morteza demonstrated good  understanding of the education provided.   .   See EMR under Patient Instructions for exercises provided initial eval.     Assessment   Pt. Reports compliance with dynasplint extension orthosis 1-2 hours daily. He states he has a lot of pain over the A1 pulley that causes increased pain from pressure from the orthosis. He states he gets a dull ache in the PIP joint that gets so bad he can't stand it and has to remove the orthosis.   Pt cont to report pain at volar wrist, volar palm at A1 pulley, and  lateral ligaments.  Pt. With limitations in passive and active extension and flexion, however can achieve more passive extension than active extension.  Pt. Cont to present with symptoms consistent with volar plate adherence. Pt. Reports continued swelling and pain and limitations to daily use. Pt. Participated with  pain increase with exercises and during manual therapy this date. He is to continue wearing the DynaSplint as instructed- He left session this date with the DynaSplint extension orthosis on.       Morteza is progressing well towards his goals and there are no updates to goals at this time. Pt prognosis continues as Excellent. Pt will continue to benefit from skilled outpatient occupational therapy to address the deficits listed in the problem list on initial evaluation, provide pt/family education and to maximize pt's level of independence in the home and community environment.     Anticipated barriers to continued occupational therapy: high grade injury    Pt's spiritual, cultural and educational needs considered and pt agreeable to plan of care and goals.    Goals      Goals:   The following goals were discussed with the patient and patient is in agreement with them as to be addressed in the treatment plan.   Long Term Goals (LTGs); to be met by discharge.  LTG #1: Pt will report a pain level of 0 out of 10 with full gripping   -progressing    LTG #2: Pt will demo improved FOTO score by 20 points.  -progressing  LTG #3: Pt will return to prior level of function for ADLs and household management.  -progressing     Short Term Goals (STGs); to be met within 4 weeks (11/01/2019).  STG #1a: Pt will report 2 out of 10 pain level with full active range of motion.. -progressing  STG #2a: Pt will report/demo [place] with tying grocery bags for diaper disposal -progressing  STG #2b: Pt will report/demo [place] with using fork and knife for feeding. -progressing  STG #3a: Pt will demonstrate  independence with issued HEP.  -progressing  STG #3b: Pt will demo improved Right LF CANCINO by 50  degrees needed to aid with lifting and carrying young children. -progressing       Plan     Continue skilled occupational therapy with individualized plan of care focusing on increasing AROM and strength required for ADLs      Updates/Grading for next session: cont to progress as able    Sheba Hall OTR/L,CHT

## 2019-11-13 ENCOUNTER — CLINICAL SUPPORT (OUTPATIENT)
Dept: REHABILITATION | Facility: HOSPITAL | Age: 44
End: 2019-11-13
Payer: COMMERCIAL

## 2019-11-13 DIAGNOSIS — Z78.9 DECREASED INDEPENDENCE WITH ACTIVITIES OF DAILY LIVING: ICD-10-CM

## 2019-11-13 DIAGNOSIS — M79.644 PAIN IN RIGHT FINGER(S): ICD-10-CM

## 2019-11-13 DIAGNOSIS — M25.641 FINGER STIFFNESS, RIGHT: ICD-10-CM

## 2019-11-13 PROCEDURE — 97022 WHIRLPOOL THERAPY: CPT | Mod: PN

## 2019-11-13 PROCEDURE — 97110 THERAPEUTIC EXERCISES: CPT | Mod: PN

## 2019-11-13 PROCEDURE — 97140 MANUAL THERAPY 1/> REGIONS: CPT | Mod: PN

## 2019-11-13 NOTE — PROGRESS NOTES
"  Occupational Therapy Daily Treatment Note      Date: 11/13/2019  Name: Morteza Cerrato  Clinic Number: [id number]    Therapy Diagnosis:   Encounter Diagnoses   Name Primary?    Pain in right finger(s)     Finger stiffness, right     Decreased independence with activities of daily living      Physician: Kenzie Watkins PA-C    Physician Orders: eval and treat  Medical Diagnosis: LF PIP dorsal dislocation   Date of Injury: 08//31/2019  Evaluation Date: 10/03/2019  Insurance Authorization Period Expiration: 12/31/2019  Plan of Care Certification Period: 10/03/2019-11/29/2019  Date of Return to MD: 11/25/2019     Visit # / Visits authorized: 10 / 50       FOTO: initial eval, 6th visit     Time In: 4pm  Time Out: 5pm  Total treatment time: 60 minutes  Total Timed minutes: 45 minutes     Precautions:  Standard    Subjective     Pt reports: " It was really stiff with the weather"     he was compliant with home exercise program given last session.   Response to previous treatment:continued pain, swelling, stiffness  Functional change: none reported    Pain: 0/10  Location: right hand at rest, higher with activities    Objective        Edema. Measured in centimeters.    10/3/2019 10/3/2019 10/14/2019     Left Right Right   Long:          P1 6.7 7.5 7.5    PIP 6.7 8.0 7.9   P2 5.2 7.5 6.7    DIP 5.7 6.9 5.9            Hand ROM. Measured in degrees.  Ext measured laterally*     10/3/2019 10/3/2019 10/14/2019 10/21/2019 11/1/19     Left Right Right Right Right     AROM AROM AROM AROM AROM           *pre heat   Long:  MP 95 70 94 95 100               25/60 25/65 20/60 45/65              DIP 90 7/30 5/35 3/40 12/35              CANCINO 290 128 164 (+36) 172 (+8) 143                         Morteza received the following supervised modalities after being cleared for contradictions for 15 minutes:   -Fluidotherapy: To right hand , continuous air, 115 deg, air speed 50 to decrease pain, edema & scar tissue, sensory re- " education, and increased tissue extensibility prior to therex              Morteza received the following manual therapy techniques for 15 minutes:   -Performed RM to right index finger to decrease edema, improve joint mobility, decrease pain and improve lymphatic drainage to increase hand function.   -use of iastm tool for deep tissue massage to volar flexors  -cross friction massage to palmar flexor about A1 pulley  - gentle grade II joint glides to PIP  - intrinsic stretches to PIP, DIP        Morteza received therapeutic exercises for 30 minutes including:  -  AROM   DIP blocking  PIP blocking  TGE  FDS isolated    X 10 reps each    Isospheres X 3 min   small pom pom p/u LF to palm x 3 containers   Med pom pom hook X 3 containers     Peach Putty   X 10 squeezes  X 10 raking/reverse raking  X 10 isolated flexion   Digit abd/add   X 20 with blue foam/Yellow rubber band          Home Exercises and Education Provided     Education provided: cont HEP, continue milla strap, LMB size C on 30 min 2x day, static orthosis at night per MD, Dynasplint as instructed  - Progress towards goals     Written Home Exercises Provided: Patient instructed to cont prior HEP.  Exercises were reviewed and Morteza was able to demonstrate them prior to the end of the session.  Morteza demonstrated good  understanding of the education provided.   .   See EMR under Patient Instructions for exercises provided initial eval.     Assessment   Pt. Reports compliance with dynasplint extension orthosis 1-2 hours daily.  He now also has a flexion Dynasplint to alternate with the extension orthosis. He reports pain at the middle phalanx more than the proximal phalanx this date. Pt cont to report pain at volar wrist, volar palm at A1 pulley, and lateral ligaments.  Pt. With limitations in passive and active extension and flexion, however can achieve more passive extension than active extension.  Pt. Cont to present with symptoms consistent with volar  plate adherence. Pt. Reports continued swelling and pain and limitations to daily use. Pt. Participated with  pain increase with exercises and during manual therapy this date. He is to continue wearing the DynaSplint as instructed- He left session this date with the DynaSplint flexion orthosis on.       Morteza is progressing well towards his goals and there are no updates to goals at this time. Pt prognosis continues as Excellent. Pt will continue to benefit from skilled outpatient occupational therapy to address the deficits listed in the problem list on initial evaluation, provide pt/family education and to maximize pt's level of independence in the home and community environment.     Anticipated barriers to continued occupational therapy: high grade injury    Pt's spiritual, cultural and educational needs considered and pt agreeable to plan of care and goals.    Goals      Goals:   The following goals were discussed with the patient and patient is in agreement with them as to be addressed in the treatment plan.   Long Term Goals (LTGs); to be met by discharge.  LTG #1: Pt will report a pain level of 0 out of 10 with full gripping   -progressing    LTG #2: Pt will demo improved FOTO score by 20 points.  -progressing  LTG #3: Pt will return to prior level of function for ADLs and household management.  -progressing     Short Term Goals (STGs); to be met within 4 weeks (11/01/2019).  STG #1a: Pt will report 2 out of 10 pain level with full active range of motion.. -progressing  STG #2a: Pt will report/demo [place] with tying grocery bags for diaper disposal -progressing  STG #2b: Pt will report/demo [place] with using fork and knife for feeding. -progressing  STG #3a: Pt will demonstrate independence with issued HEP.  -progressing  STG #3b: Pt will demo improved Right LF CANCINO by 50  degrees needed to aid with lifting and carrying young children. -progressing       Plan     Continue skilled occupational  therapy with individualized plan of care focusing on increasing AROM and strength required for ADLs      Updates/Grading for next session: cont to progress as able    Sheba Hall, OTR/L,CHT

## 2019-11-18 ENCOUNTER — CLINICAL SUPPORT (OUTPATIENT)
Dept: REHABILITATION | Facility: HOSPITAL | Age: 44
End: 2019-11-18
Payer: COMMERCIAL

## 2019-11-18 DIAGNOSIS — M79.644 PAIN IN RIGHT FINGER(S): ICD-10-CM

## 2019-11-18 DIAGNOSIS — Z78.9 DECREASED INDEPENDENCE WITH ACTIVITIES OF DAILY LIVING: ICD-10-CM

## 2019-11-18 DIAGNOSIS — M25.641 FINGER STIFFNESS, RIGHT: ICD-10-CM

## 2019-11-18 PROCEDURE — 97022 WHIRLPOOL THERAPY: CPT | Mod: PN

## 2019-11-18 PROCEDURE — 97110 THERAPEUTIC EXERCISES: CPT | Mod: PN

## 2019-11-18 PROCEDURE — 97140 MANUAL THERAPY 1/> REGIONS: CPT | Mod: PN

## 2019-11-18 NOTE — PROGRESS NOTES
"  Occupational Therapy Daily Treatment Note      Date: 11/18/2019  Name: Morteza Cerrato  Clinic Number: [id number]    Therapy Diagnosis:   Encounter Diagnoses   Name Primary?    Pain in right finger(s)     Finger stiffness, right     Decreased independence with activities of daily living      Physician: Kenzie Watkins PA-C    Physician Orders: eval and treat  Medical Diagnosis: LF PIP dorsal dislocation   Date of Injury: 08//31/2019  Evaluation Date: 10/03/2019  Insurance Authorization Period Expiration: 12/31/2019  Plan of Care Certification Period: 10/03/2019-11/29/2019  Date of Return to MD: 11/25/2019     Visit # / Visits authorized: 10 / 50       FOTO: initial eval, 6th visit     Time In: 4pm  Time Out: 5pm  Total treatment time: 60 minutes  Total Timed minutes: 45 minutes     Precautions:  Standard    Subjective     Pt reports: " It was really stiff with the weather"     he was compliant with home exercise program given last session.   Response to previous treatment:continued pain, swelling, stiffness  Functional change: none reported    Pain: 0/10  Location: right hand at rest, higher with activities    Objective        Edema. Measured in centimeters.    10/3/2019 10/3/2019 10/14/2019     Left Right Right   Long:          P1 6.7 7.5 7.5    PIP 6.7 8.0 7.9   P2 5.2 7.5 6.7    DIP 5.7 6.9 5.9            Hand ROM. Measured in degrees.  Ext measured laterally*     10/3/2019 10/3/2019 10/14/2019 10/21/2019 11/1/19 11/18/2019     Left Right Right Right Right Right     AROM AROM AROM AROM AROM AROM           *pre heat *post heat   Long:  MP 95 70 94 95 100 100               25/60 25/65 20/60 45/65 55/70              DIP 90 7/30 5/35 3/40 12/35 15/43              CANCINO 290 128 164 (+36) 172 (+8) 143 143 (=)                          Morteza received the following supervised modalities after being cleared for contradictions for 15 minutes:   -Fluidotherapy: To right hand , continuous air, 115 deg, air speed " 50 to decrease pain, edema & scar tissue, sensory re- education, and increased tissue extensibility prior to therex              Morteza received the following manual therapy techniques for 15 minutes:   -Performed RM to right index finger to decrease edema, improve joint mobility, decrease pain and improve lymphatic drainage to increase hand function.   -use of iastm tool for deep tissue massage to volar flexors  -cross friction massage to palmar flexor about A1 pulley  - gentle grade II joint glides to PIP  - intrinsic stretches to PIP, DIP        Morteza received therapeutic exercises for 30 minutes including:  -  AROM   DIP blocking  PIP blocking  TGE  FDS isolated    X 10 reps each    Isospheres X 3 min   small pom pom p/u LF to palm x 3 containers   Med pom pom hook X 3 containers     Peach Putty   X 10 squeezes  X 10 raking/reverse raking  X 10 isolated flexion   Digit abd/add   X 20 with blue foam/Yellow rubber band          Home Exercises and Education Provided     Education provided: cont HEP, continue milla strap, LMB size C on 30 min 2x day, static orthosis at night per MD, Dynasplint as instructed  - Progress towards goals     Written Home Exercises Provided: Patient instructed to cont prior HEP.  Exercises were reviewed and Morteza was able to demonstrate them prior to the end of the session.  Morteza demonstrated good  understanding of the education provided.   .   See EMR under Patient Instructions for exercises provided initial eval.     Assessment   Pt. With decreases in PIP extension this date and slight increase in PIP flexion. He reports he has been wearing the flexion dynasplint today. . He continues to report pain at the middle phalanx more than the proximal phalanx this date. Pt cont to report pain at volar wrist, volar palm at A1 pulley, and lateral ligaments.  Pt. With limitations in passive and active extension and flexion, however can achieve more passive extension than active extension.   Pt. Cont to present with symptoms consistent with volar plate adherence. Pt. Reports continued swelling and pain and limitations to daily use. Pt. Participated with  pain increase with exercises and during manual therapy this date. He is to continue wearing the DynaSplint as instructed. He is using resisted theraputty to aid in pull through of the flexor tendons.       Morteza is progressing well towards his goals and there are no updates to goals at this time. Pt prognosis continues as Excellent. Pt will continue to benefit from skilled outpatient occupational therapy to address the deficits listed in the problem list on initial evaluation, provide pt/family education and to maximize pt's level of independence in the home and community environment.     Anticipated barriers to continued occupational therapy: high grade injury    Pt's spiritual, cultural and educational needs considered and pt agreeable to plan of care and goals.    Goals      Goals:   The following goals were discussed with the patient and patient is in agreement with them as to be addressed in the treatment plan.   Long Term Goals (LTGs); to be met by discharge.  LTG #1: Pt will report a pain level of 0 out of 10 with full gripping   -progressing    LTG #2: Pt will demo improved FOTO score by 20 points.  -progressing  LTG #3: Pt will return to prior level of function for ADLs and household management.  -progressing     Short Term Goals (STGs); to be met within 4 weeks (11/01/2019).  STG #1a: Pt will report 2 out of 10 pain level with full active range of motion.. -progressing  STG #2a: Pt will report/demo [place] with tying grocery bags for diaper disposal -progressing  STG #2b: Pt will report/demo [place] with using fork and knife for feeding. -progressing  STG #3a: Pt will demonstrate independence with issued HEP.  -progressing  STG #3b: Pt will demo improved Right LF CANCINO by 50  degrees needed to aid with lifting and carrying young  children. -progressing       Plan     Continue skilled occupational therapy with individualized plan of care focusing on increasing AROM and strength required for ADLs      Updates/Grading for next session: cont to progress as able    Sheba Hall OTR/L,CHT

## 2019-11-20 ENCOUNTER — DOCUMENTATION ONLY (OUTPATIENT)
Dept: REHABILITATION | Facility: HOSPITAL | Age: 44
End: 2019-11-20

## 2019-11-20 ENCOUNTER — CLINICAL SUPPORT (OUTPATIENT)
Dept: REHABILITATION | Facility: HOSPITAL | Age: 44
End: 2019-11-20
Payer: COMMERCIAL

## 2019-11-20 DIAGNOSIS — Z78.9 DECREASED INDEPENDENCE WITH ACTIVITIES OF DAILY LIVING: ICD-10-CM

## 2019-11-20 DIAGNOSIS — M25.641 FINGER STIFFNESS, RIGHT: ICD-10-CM

## 2019-11-20 DIAGNOSIS — M79.644 PAIN IN RIGHT FINGER(S): ICD-10-CM

## 2019-11-20 PROCEDURE — 97022 WHIRLPOOL THERAPY: CPT | Mod: PN

## 2019-11-20 PROCEDURE — 97140 MANUAL THERAPY 1/> REGIONS: CPT | Mod: PN

## 2019-11-20 PROCEDURE — 97110 THERAPEUTIC EXERCISES: CPT | Mod: PN

## 2019-11-20 NOTE — PROGRESS NOTES
"  Occupational Therapy Daily Treatment Note      Date: 11/20/2019  Name: Morteza Cerrato  Clinic Number: [id number]    Therapy Diagnosis:   Encounter Diagnoses   Name Primary?    Pain in right finger(s)     Finger stiffness, right     Decreased independence with activities of daily living      Physician: Kenzie Watkins PA-C    Physician Orders: eval and treat  Medical Diagnosis: LF PIP dorsal dislocation   Date of Injury: 08//31/2019  Evaluation Date: 10/03/2019  Insurance Authorization Period Expiration: 12/31/2019  Plan of Care Certification Period: 10/03/2019-11/29/2019  Date of Return to MD: 11/25/2019     Visit # / Visits authorized: 11 / 50       FOTO: initial eval, 6th visit     Time In: 345pm  Time Out: 445pm  Total treatment time: 60 minutes  Total Timed minutes: 45 minutes     Precautions:  Standard    Subjective     Pt reports: " I'm trying to wear the splint that straightens it out"     he was compliant with home exercise program given last session.   Response to previous treatment:continued pain, swelling, stiffness  Functional change: none reported    Pain: 0/10  Location: right hand at rest, higher with activities    Objective        Edema. Measured in centimeters.    10/3/2019 10/3/2019 10/14/2019     Left Right Right   Long:          P1 6.7 7.5 7.5    PIP 6.7 8.0 7.9   P2 5.2 7.5 6.7    DIP 5.7 6.9 5.9            Hand ROM. Measured in degrees.  Ext measured laterally*     10/3/2019 10/3/2019 10/14/2019 10/21/2019 11/1/19 11/18/2019 11/20/2019     Left Right Right Right Right Right Right     AROM AROM AROM AROM AROM AROM AROM           *pre heat *post heat *pre heat   Long:  MP 95 70 94 95 90 90 90               25/60 25/65 20/60 45/65 55/70 40/58              DIP 90 7/30 5/35 3/40 12/35 15/43 12/35              CANCINO 290 128 164 (+36) 172 (+8) 133 133 (=) 131 (-2)             At the end of session*    0/90  45/70  15/45  145               Morteza received the following supervised " modalities after being cleared for contradictions for 15 minutes:   -Fluidotherapy: To right hand , continuous air, 115 deg, air speed 50 to decrease pain, edema & scar tissue, sensory re- education, and increased tissue extensibility prior to therex              Morteza received the following manual therapy techniques for 15 minutes:   -Performed RM to right index finger to decrease edema, improve joint mobility, decrease pain and improve lymphatic drainage to increase hand function.   -use of iastm tool for deep tissue massage to volar flexors  -cross friction massage to palmar flexor about A1 pulley  - gentle grade II joint glides to PIP  - intrinsic stretches to PIP, DIP        Morteza received therapeutic exercises for 30 minutes including:  -  AROM   DIP blocking  PIP blocking  TGE  FDS isolated    X 10 reps each    Isospheres X 3 min   small pom pom p/u LF to palm x 3 containers   Med pom pom hook X 3 containers     Peach Putty   X 10 squeezes  X 10 raking/reverse raking  X 10 isolated flexion   Digit abd/add   X 20 with blue foam/Yellow rubber band          Home Exercises and Education Provided     Education provided: cont HEP, continue milla strap, LMB size C on 30 min 2x day, static orthosis at night per MD, Dynasplint as instructed  - Progress towards goals     Written Home Exercises Provided: Patient instructed to cont prior HEP.  Exercises were reviewed and Morteza was able to demonstrate them prior to the end of the session.  Morteza demonstrated good  understanding of the education provided.   .   See EMR under Patient Instructions for exercises provided initial eval.     Assessment   Pt. With increases in PIP extension but decreases in PIP  Flexion. He has been wearing both dynasplints for equal amounts of time but he has been wearing the LMB extension orthosis a little more during the day.  He is wearing the static extension orthosis at night.  He continues to report pain at the middle phalanx more  than the proximal phalanx this date. Pt cont to report pain at volar wrist, volar palm at A1 pulley, and lateral ligaments.Pt. Cont to present with symptoms consistent with volar plate adherence. Pt. Reports continued swelling and pain and limitations to daily use. Pt. Participated with  pain increase with exercises and during manual therapy this date. He is to continue wearing the DynaSplint as instructed. He is using resisted theraputty to aid in pull through of the flexor tendons.       Morteza is progressing well towards his goals and there are no updates to goals at this time. Pt prognosis continues as Excellent. Pt will continue to benefit from skilled outpatient occupational therapy to address the deficits listed in the problem list on initial evaluation, provide pt/family education and to maximize pt's level of independence in the home and community environment.     Anticipated barriers to continued occupational therapy: high grade injury    Pt's spiritual, cultural and educational needs considered and pt agreeable to plan of care and goals.    Goals      Goals:   The following goals were discussed with the patient and patient is in agreement with them as to be addressed in the treatment plan.   Long Term Goals (LTGs); to be met by discharge.  LTG #1: Pt will report a pain level of 0 out of 10 with full gripping   -progressing    LTG #2: Pt will demo improved FOTO score by 20 points.  -progressing  LTG #3: Pt will return to prior level of function for ADLs and household management.  -progressing     Short Term Goals (STGs); to be met within 4 weeks (11/01/2019).  STG #1a: Pt will report 2 out of 10 pain level with full active range of motion.. -progressing  STG #2a: Pt will report/demo [place] with tying grocery bags for diaper disposal -progressing  STG #2b: Pt will report/demo [place] with using fork and knife for feeding. -progressing  STG #3a: Pt will demonstrate independence with issued HEP.   -progressing  STG #3b: Pt will demo improved Right LF CANCINO by 50  degrees needed to aid with lifting and carrying young children. -progressing       Plan     Continue skilled occupational therapy with individualized plan of care focusing on increasing AROM and strength required for ADLs      Updates/Grading for next session: cont to progress as able    Sheba Hall OTR/L,CHT

## 2019-11-21 NOTE — PROGRESS NOTES
OCCUPATIONAL THERAPY RETURN TO DOCTOR PROGRESS NOTE  Patient: Morteza Cerrato  MRN: [id number]  Date of Evaluation: 10/03/2019  Referring Physician:  Dr. Rufino Bishop MD visit: 11/25/2019  Primary Diagnosis: S63.252A (ICD-10-CM) - Closed dislocation of right middle finger  Treatment Diagnosis:   1. Pain in right finger(s)     2. Finger stiffness, right     3. Decreased independence with activities of daily living       Date of Injury: 08/3/2019    TOTAL VISITS: 11    Pt. Was evaluated by skilled OT 4.5 weeks post injury. Pt. Presented at initial OT visit with 25 degree flexion contracture of the PIP joint of the LF. He was wearing a static extension orthosis most of the day, removing for exercises. Pt. Has started wearing static extension orthosis at night only and alternating flexion and extension DynaSplints for 1-2 hours/day each over the past month.  He continues to complain of pain over the area of the A1 pulley as well as in the middle phalanx. He demonstrates symptoms consistent with volar plate adherence. He is tolerating manual therapy interventions including joint glides, mobilization with movement, intrinsic and extrinsic stretches, use of ASTYM tools and passive motion- all with a high level of pain.    Please see objective measurements below for detailed measurements.     Please contact me with regards to further instructions, Thank you,    Sheba Hall, OTR/L, CHT  Occupational therapist, Certified Hand Therapist  [facility]  [phone] phone  [phone] fax  [email or web address]    Objective:       10/3/2019 10/3/2019 10/14/2019 10/21/2019 11/1/19 11/18/2019 11/20/2019     INITIAL EVAL          Left Right Right Right Right Right Right     AROM AROM AROM AROM AROM AROM AROM             *pre heat *post heat *pre heat   Long:  MP 95 70 94 95 90 90 90               25/60 25/65 20/60 45/65 55/70 40/58              DIP 90 7/30 5/35 3/40 12/35 15/43 12/35               CANCINO 290 128 164 (+36) 172 (+8) 133 133 (=) 131 (-2)                 At the end of session*     0/90  45/70  15/45  145

## 2019-11-27 ENCOUNTER — CLINICAL SUPPORT (OUTPATIENT)
Dept: REHABILITATION | Facility: HOSPITAL | Age: 44
End: 2019-11-27
Payer: COMMERCIAL

## 2019-11-27 DIAGNOSIS — M79.644 PAIN IN RIGHT FINGER(S): ICD-10-CM

## 2019-11-27 DIAGNOSIS — M25.641 FINGER STIFFNESS, RIGHT: ICD-10-CM

## 2019-11-27 DIAGNOSIS — Z78.9 DECREASED INDEPENDENCE WITH ACTIVITIES OF DAILY LIVING: ICD-10-CM

## 2019-11-27 PROCEDURE — 97140 MANUAL THERAPY 1/> REGIONS: CPT | Mod: PN

## 2019-11-27 PROCEDURE — 97110 THERAPEUTIC EXERCISES: CPT | Mod: PN

## 2019-11-27 PROCEDURE — 97022 WHIRLPOOL THERAPY: CPT | Mod: PN

## 2019-11-27 NOTE — PROGRESS NOTES
"  Occupational Therapy Daily Treatment Note      Date: 11/27/2019  Name: Morteza Cerrato  Clinic Number: [id number]    Therapy Diagnosis:   Encounter Diagnoses   Name Primary?    Pain in right finger(s)     Finger stiffness, right     Decreased independence with activities of daily living      Physician: Kenzie Watkins PA-C    Physician Orders: eval and treat  Medical Diagnosis: LF PIP dorsal dislocation   Date of Injury: 08//31/2019  Evaluation Date: 10/03/2019  Insurance Authorization Period Expiration: 12/31/2019  Plan of Care Certification Period: 11/29/2019-01/24/2020  Date of Return to MD: 12/23/2019     Visit # / Visits authorized: 12 / 50       FOTO: initial eval, 6th visit     Time In: 345pm  Time Out: 445pm  Total treatment time: 60 minutes  Total Timed minutes: 45 minutes     Precautions:  Standard    Subjective     Pt reports: " I saw the doctor he said he doesn't want to do surgery until 4-6 months until the swelling or redness goes away. I didn't feel like it was that swollen"      he was compliant with home exercise program given last session.   Response to previous treatment:continued pain, swelling, stiffness  Functional change: none reported    Pain: 0/10  Location: right hand at rest, higher with activities    Objective        Edema. Measured in centimeters.    10/3/2019 10/3/2019 10/14/2019     Left Right Right   Long:          P1 6.7 7.5 7.5    PIP 6.7 8.0 7.9   P2 5.2 7.5 6.7    DIP 5.7 6.9 5.9            Hand ROM. Measured in degrees.  Ext measured laterally*     10/3/2019 10/3/2019 10/14/2019 10/21/2019 11/1/19 11/18/2019 11/20/2019     Left Right Right Right Right Right Right     AROM AROM AROM AROM AROM AROM AROM           *pre heat *post heat *pre heat   Long:  MP 95 70 94 95 90 90 90               25/60 25/65 20/60 45/65 55/70 40/58              DIP 90 7/30 5/35 3/40 12/35 15/43 12/35              CANCINO 290 128 164 (+36) 172 (+8) 133 133 (=) 131 (-2)             At the end of " session*    0/90  45/70  15/45  145               Morteza received the following supervised modalities after being cleared for contradictions for 15 minutes:   -Fluidotherapy: To right hand , continuous air, 115 deg, air speed 50 to decrease pain, edema & scar tissue, sensory re- education, and increased tissue extensibility prior to therex              Morteza received the following manual therapy techniques for 15 minutes:   -Performed RM to right index finger to decrease edema, improve joint mobility, decrease pain and improve lymphatic drainage to increase hand function.   -use of [employer] tool for deep tissue massage to volar flexors  -cross friction massage to palmar flexor about A1 pulley  - gentle grade II joint glides to PIP  - intrinsic stretches to PIP, DIP        Morteza received therapeutic exercises for 30 minutes including:  -  AROM   DIP blocking  PIP blocking  TGE  FDS isolated    X 10 reps each    Isospheres X 3 min   small pom pom p/u LF to palm x 3 containers   Med pom pom hook X 3 containers     Peach Putty   X 10 squeezes  X 10 raking/reverse raking  X 10 isolated flexion   Digit abd/add   X 20 with blue foam/Yellow rubber band          Home Exercises and Education Provided     Education provided: cont HEP, continue milla strap, LMB size C on 30 min 2x day, static orthosis at night per MD, [place] as instructed  - Progress towards goals     Written Home Exercises Provided: Patient instructed to cont prior HEP.  Exercises were reviewed and Morteza was able to demonstrate them prior to the end of the session.  Morteza demonstrated good  understanding of the education provided.   .   See EMR under Patient Instructions for exercises provided initial eval.     Assessment   Pt. To begin with wearing static extension during day more and taking off for 6x day exercises and 1-2 hours flexion dynanaplint 2 x day and extension dynaplint before bed and the static orthosis overnight.   He continues to  report pain at the middle phalanx more than the proximal phalanx this date. Pt cont to report pain at volar wrist, volar palm at A1 pulley, and lateral ligaments.Pt. Cont to present with symptoms consistent with volar plate adherence. Pt. Reports continued swelling and pain and limitations to daily use. Pt. Participated with  pain increase with exercises and during manual therapy this date. He is to continue wearing the DynaSplint as instructed. He is using resisted theraputty to aid in pull through of the flexor tendons.       Morteza is progressing well towards his goals and there are no updates to goals at this time. Pt prognosis continues as Excellent. Pt will continue to benefit from skilled outpatient occupational therapy to address the deficits listed in the problem list on initial evaluation, provide pt/family education and to maximize pt's level of independence in the home and community environment.     Anticipated barriers to continued occupational therapy: high grade injury    Pt's spiritual, cultural and educational needs considered and pt agreeable to plan of care and goals.    Goals      Goals:   The following goals were discussed with the patient and patient is in agreement with them as to be addressed in the treatment plan.   Long Term Goals (LTGs); to be met by discharge.  LTG #1: Pt will report a pain level of 0 out of 10 with full gripping   -progressing    LTG #2: Pt will demo improved FOTO score by 20 points.  -progressing  LTG #3: Pt will return to prior level of function for ADLs and household management.  -progressing     Short Term Goals (STGs); to be met within 4 weeks (11/01/2019).  STG #1a: Pt will report 2 out of 10 pain level with full active range of motion.. -progressing  STG #2a: Pt will report/demo [place] with tying grocery bags for diaper disposal -progressing  STG #2b: Pt will report/demo [place] with using fork and knife for feeding. -progressing  STG #3a: Pt will  demonstrate independence with issued HEP.  -progressing  STG #3b: Pt will demo improved Right LF CANCINO by 50  degrees needed to aid with lifting and carrying young children. -progressing       Plan     Continue skilled occupational therapy with individualized plan of care focusing on increasing AROM and strength required for ADLs      Updates/Grading for next session: cont to progress as able    Sheba Hall OTR/L,CHT

## 2019-12-02 ENCOUNTER — CLINICAL SUPPORT (OUTPATIENT)
Dept: REHABILITATION | Facility: HOSPITAL | Age: 44
End: 2019-12-02
Payer: COMMERCIAL

## 2019-12-02 DIAGNOSIS — M25.641 FINGER STIFFNESS, RIGHT: ICD-10-CM

## 2019-12-02 DIAGNOSIS — M79.644 PAIN IN RIGHT FINGER(S): ICD-10-CM

## 2019-12-02 DIAGNOSIS — Z78.9 DECREASED INDEPENDENCE WITH ACTIVITIES OF DAILY LIVING: ICD-10-CM

## 2019-12-02 PROCEDURE — 97140 MANUAL THERAPY 1/> REGIONS: CPT | Mod: PN

## 2019-12-02 PROCEDURE — 97110 THERAPEUTIC EXERCISES: CPT | Mod: PN

## 2019-12-02 PROCEDURE — 97022 WHIRLPOOL THERAPY: CPT | Mod: PN

## 2019-12-02 NOTE — PROGRESS NOTES
"  Occupational Therapy Daily Treatment Note      Date: 12/2/2019  Name: Morteza Cerrato  Clinic Number: [id number]    Therapy Diagnosis:   Encounter Diagnoses   Name Primary?    Pain in right finger(s)     Finger stiffness, right     Decreased independence with activities of daily living      Physician: Kenzie Watkins PA-C    Physician Orders: eval and treat  Medical Diagnosis: LF PIP dorsal dislocation   Date of Injury: 08//31/2019  Evaluation Date: 10/03/2019  Insurance Authorization Period Expiration: 12/31/2019  Plan of Care Certification Period: 11/29/2019-01/24/2020  Date of Return to MD: 12/23/2019     Visit # / Visits authorized: 13 / 50       FOTO: initial eval, 6th visit     Time In: 345pm  Time Out: 445pm  Total treatment time: 60 minutes  Total Timed minutes: 45 minutes     Precautions:  Standard    Subjective     Pt reports: " Its stuck flexed right now because I've been wearing the bendy splint"      he was compliant with home exercise program given last session.   Response to previous treatment:continued pain, swelling, stiffness  Functional change: none reported    Pain: 0/10  Location: right hand at rest, higher with activities    Objective        Edema. Measured in centimeters.    10/3/2019 10/3/2019 10/14/2019     Left Right Right   Long:          P1 6.7 7.5 7.5    PIP 6.7 8.0 7.9   P2 5.2 7.5 6.7    DIP 5.7 6.9 5.9            Hand ROM. Measured in degrees.  Ext measured laterally*     10/3/2019 10/3/2019 10/14/2019 10/21/2019 11/1/19 11/18/2019 11/20/2019     Left Right Right Right Right Right Right     AROM AROM AROM AROM AROM AROM AROM           *pre heat *post heat *pre heat   Long:  MP 95 70 94 95 90 90 90               25/60 25/65 20/60 45/65 55/70 40/58              DIP 90 7/30 5/35 3/40 12/35 15/43 12/35              CANCINO 290 128 164 (+36) 172 (+8) 133 133 (=) 131 (-2)             At the end of session*    0/90  45/70  15/45  145               Morteza received the following " supervised modalities after being cleared for contradictions for 15 minutes:   -Fluidotherapy: To right hand , continuous air, 115 deg, air speed 50 to decrease pain, edema & scar tissue, sensory re- education, and increased tissue extensibility prior to therex              Morteza received the following manual therapy techniques for 15 minutes:   -Performed RM to right index finger to decrease edema, improve joint mobility, decrease pain and improve lymphatic drainage to increase hand function.   -use of [employer] tool for deep tissue massage to volar flexors  -cross friction massage to palmar flexor about A1 pulley  - gentle grade II joint glides to PIP  - intrinsic stretches to PIP, DIP        Morteza received therapeutic exercises for 30 minutes including:  -  AROM   DIP blocking  PIP blocking  TGE  FDS isolated    X 10 reps each    Hook with MP block  x 3 min    Isospheres X 3 min   small pom pom p/u LF to palm x 3 containers   Med pom pom hook X 3 containers     Peach Putty   X 10 squeezes  X 10 raking/reverse raking  X 10 isolated flexion   Digit abd/add   X 20 with blue foam/Yellow rubber band          Home Exercises and Education Provided     Education provided: cont HEP, continue milla strap, LMB size C on 30 min 2x day, static orthosis at night per MD, [place] as instructed  - Progress towards goals     Written Home Exercises Provided: Patient instructed to cont prior HEP.  Exercises were reviewed and Morteza was able to demonstrate them prior to the end of the session.  Morteza demonstrated good  understanding of the education provided.   .   See EMR under Patient Instructions for exercises provided initial eval.     Assessment   Pt. To begin with wearing static extension during day more and taking off for 6x day exercises and 1-2 hours flexion dynanaplint 2 x day and extension dynaplint before bed and the static orthosis overnight.   He continues to report pain at the middle phalanx more than the  proximal phalanx this date. Pt cont to report pain at volar wrist, volar palm at A1 pulley, and lateral ligaments.Pt. Cont to present with symptoms consistent with volar plate adherence. Pt. Reports continued swelling and pain and limitations to daily use. Pt. Participated with  pain increase with exercises and during manual therapy this date. He is to continue wearing the DynaSplint as instructed. He is using resisted theraputty to aid in pull through of the flexor tendons.       Morteza is progressing well towards his goals and there are no updates to goals at this time. Pt prognosis continues as Excellent. Pt will continue to benefit from skilled outpatient occupational therapy to address the deficits listed in the problem list on initial evaluation, provide pt/family education and to maximize pt's level of independence in the home and community environment.     Anticipated barriers to continued occupational therapy: high grade injury    Pt's spiritual, cultural and educational needs considered and pt agreeable to plan of care and goals.    Goals      Goals:   The following goals were discussed with the patient and patient is in agreement with them as to be addressed in the treatment plan.   Long Term Goals (LTGs); to be met by discharge.  LTG #1: Pt will report a pain level of 0 out of 10 with full gripping   -progressing    LTG #2: Pt will demo improved FOTO score by 20 points.  -progressing  LTG #3: Pt will return to prior level of function for ADLs and household management.  -progressing     Short Term Goals (STGs); to be met within 4 weeks (11/01/2019).  STG #1a: Pt will report 2 out of 10 pain level with full active range of motion.. -progressing  STG #2a: Pt will report/demo [place] with tying grocery bags for diaper disposal -progressing  STG #2b: Pt will report/demo [place] with using fork and knife for feeding. -progressing  STG #3a: Pt will demonstrate independence with issued HEP.   -progressing  STG #3b: Pt will demo improved Right LF CANCINO by 50  degrees needed to aid with lifting and carrying young children. -progressing       Plan     Continue skilled occupational therapy with individualized plan of care focusing on increasing AROM and strength required for ADLs      Updates/Grading for next session: cont to progress as able    Sheba Hall OTR/L,CHT

## 2019-12-04 ENCOUNTER — CLINICAL SUPPORT (OUTPATIENT)
Dept: REHABILITATION | Facility: HOSPITAL | Age: 44
End: 2019-12-04
Payer: COMMERCIAL

## 2019-12-04 DIAGNOSIS — Z78.9 DECREASED INDEPENDENCE WITH ACTIVITIES OF DAILY LIVING: ICD-10-CM

## 2019-12-04 DIAGNOSIS — M79.644 PAIN IN RIGHT FINGER(S): ICD-10-CM

## 2019-12-04 DIAGNOSIS — M25.641 FINGER STIFFNESS, RIGHT: ICD-10-CM

## 2019-12-04 PROCEDURE — 97018 PARAFFIN BATH THERAPY: CPT | Mod: PN

## 2019-12-04 PROCEDURE — 97140 MANUAL THERAPY 1/> REGIONS: CPT | Mod: PN

## 2019-12-04 PROCEDURE — 97110 THERAPEUTIC EXERCISES: CPT | Mod: PN

## 2019-12-05 NOTE — PROGRESS NOTES
"  Occupational Therapy Daily Treatment Note      Date: 12/4/2019  Name: Morteza Cerrato  Clinic Number: [id number]    Therapy Diagnosis:   Encounter Diagnoses   Name Primary?    Pain in right finger(s)     Finger stiffness, right     Decreased independence with activities of daily living      Physician: Kenzie Watkins PA-C    Physician Orders: eval and treat  Medical Diagnosis: LF PIP dorsal dislocation   Date of Injury: 08//31/2019  Evaluation Date: 10/03/2019  Insurance Authorization Period Expiration: 12/31/2019  Plan of Care Certification Period: 11/29/2019-01/24/2020  Date of Return to MD: 12/23/2019     Visit # / Visits authorized: 13 / 50       FOTO: initial eval, 6th visit     Time In: 345pm  Time Out: 445pm  Total treatment time: 60 minutes  Total Timed minutes: 45 minutes     Precautions:  Standard    Subjective     Pt reports: " It's doing about the same"      he was compliant with home exercise program given last session.   Response to previous treatment:continued pain, swelling, stiffness  Functional change: none reported    Pain: 0/10  Location: right hand at rest, higher with activities    Objective        Edema. Measured in centimeters.    10/3/2019 10/3/2019 10/14/2019     Left Right Right   Long:          P1 6.7 7.5 7.5    PIP 6.7 8.0 7.9   P2 5.2 7.5 6.7    DIP 5.7 6.9 5.9            Hand ROM. Measured in degrees.  Ext measured laterally*     10/3/2019 10/3/2019 10/14/2019 10/21/2019 11/1/19 11/18/2019 11/20/2019     Left Right Right Right Right Right Right     AROM AROM AROM AROM AROM AROM AROM           *pre heat *post heat *pre heat   Long:  MP 95 70 94 95 90 90 90               25/60 25/65 20/60 45/65 55/70 40/58              DIP 90 7/30 5/35 3/40 12/35 15/43 12/35              CANCINO 290 128 164 (+36) 172 (+8) 133 133 (=) 131 (-2)             At the end of session*    0/90  45/70  15/45  145               Morteza received the following supervised modalities after being cleared for " contradictions for 15 minutes:     Patient received paraffin bath to right hand with LMB size C, moist heat pack, and 5# ankle/cuff weight for extension stretch to increase blood flow, circulation, pain management and for tissue elasticity prior to therex.      * NOT AVAILABLE TODAY-Fluidotherapy: To right hand , continuous air, 115 deg, air speed 50 to decrease pain, edema & scar tissue, sensory re- education, and increased tissue extensibility prior to therex              Morteza received the following manual therapy techniques for 15 minutes:   -Performed RM to right index finger to decrease edema, improve joint mobility, decrease pain and improve lymphatic drainage to increase hand function.   -use of Hmall.ma tool for deep tissue massage to volar flexors  -cross friction massage to palmar flexor about A1 pulley  - gentle grade II joint glides to PIP  - intrinsic stretches to PIP, DIP        Morteza received therapeutic exercises for 30 minutes including:  -  AROM   DIP blocking  PIP blocking  TGE  FDS isolated    X 10 reps each    Hook with MP block  x 3 min    Isospheres X 3 min   small pom pom p/u LF to palm x 3 containers   Med pom pom hook X 3 containers     Peach Putty   X 10 squeezes  X 10 raking/reverse raking  X 10 isolated flexion   Digit abd/add   X 20 with blue foam/Yellow rubber band          Home Exercises and Education Provided     Education provided: cont HEP, continue milla strap, LMB size C on 30 min 2x day, static orthosis at night per MD, [place] as instructed  - Progress towards goals     Written Home Exercises Provided: Patient instructed to cont prior HEP.  Exercises were reviewed and Morteza was able to demonstrate them prior to the end of the session.  Morteza demonstrated good  understanding of the education provided.   .   See EMR under Patient Instructions for exercises provided initial eval.     Assessment   Pt. Wearing the extension orthosis more.  He continues to report pain at the  middle phalanx more than the proximal phalanx this date. Pt cont to report pain at volar wrist, volar palm at A1 pulley, and lateral ligaments.Pt. Cont to present with symptoms consistent with volar plate adherence. Pt. Reports continued swelling and pain and limitations to daily use. Pt. Participated with  pain increase with exercises and during manual therapy this date. He is to continue wearing the DynaSplint as instructed. He is using resisted theraputty to aid in pull through of the flexor tendons.       Morteza is progressing well towards his goals and there are no updates to goals at this time. Pt prognosis continues as Excellent. Pt will continue to benefit from skilled outpatient occupational therapy to address the deficits listed in the problem list on initial evaluation, provide pt/family education and to maximize pt's level of independence in the home and community environment.     Anticipated barriers to continued occupational therapy: high grade injury    Pt's spiritual, cultural and educational needs considered and pt agreeable to plan of care and goals.    Goals      Goals:   The following goals were discussed with the patient and patient is in agreement with them as to be addressed in the treatment plan.   Long Term Goals (LTGs); to be met by discharge.  LTG #1: Pt will report a pain level of 0 out of 10 with full gripping   -progressing    LTG #2: Pt will demo improved FOTO score by 20 points.  -progressing  LTG #3: Pt will return to prior level of function for ADLs and household management.  -progressing     Short Term Goals (STGs); to be met within 4 weeks (11/01/2019).  STG #1a: Pt will report 2 out of 10 pain level with full active range of motion.. -progressing  STG #2a: Pt will report/demo [place] with tying grocery bags for diaper disposal -progressing  STG #2b: Pt will report/demo [place] with using fork and knife for feeding. -progressing  STG #3a: Pt will demonstrate  independence with issued HEP.  -progressing  STG #3b: Pt will demo improved Right LF CANCINO by 50  degrees needed to aid with lifting and carrying young children. -progressing       Plan     Continue skilled occupational therapy with individualized plan of care focusing on increasing AROM and strength required for ADLs      Updates/Grading for next session: cont to progress as able    Sheba Hall OTR/L,CHT

## 2019-12-09 ENCOUNTER — CLINICAL SUPPORT (OUTPATIENT)
Dept: REHABILITATION | Facility: HOSPITAL | Age: 44
End: 2019-12-09
Payer: COMMERCIAL

## 2019-12-09 DIAGNOSIS — M25.641 FINGER STIFFNESS, RIGHT: ICD-10-CM

## 2019-12-09 DIAGNOSIS — M79.644 PAIN IN RIGHT FINGER(S): ICD-10-CM

## 2019-12-09 DIAGNOSIS — Z78.9 DECREASED INDEPENDENCE WITH ACTIVITIES OF DAILY LIVING: ICD-10-CM

## 2019-12-09 PROCEDURE — 97140 MANUAL THERAPY 1/> REGIONS: CPT | Mod: PN

## 2019-12-09 PROCEDURE — 97110 THERAPEUTIC EXERCISES: CPT | Mod: PN

## 2019-12-09 PROCEDURE — 97022 WHIRLPOOL THERAPY: CPT | Mod: PN

## 2019-12-09 NOTE — PROGRESS NOTES
"  Occupational Therapy Daily Treatment Note      Date: 12/9/2019  Name: Morteza Cerrato  Clinic Number: [id number]    Therapy Diagnosis:   Encounter Diagnoses   Name Primary?    Pain in right finger(s)     Finger stiffness, right     Decreased independence with activities of daily living      Physician: Kenzie Watkins PA-C    Physician Orders: eval and treat  Medical Diagnosis: LF PIP dorsal dislocation   Date of Injury: 08//31/2019  Evaluation Date: 10/03/2019  Insurance Authorization Period Expiration: 12/31/2019  Plan of Care Certification Period: 11/29/2019-01/24/2020  Date of Return to MD: 12/23/2019     Visit # / Visits authorized: 14 / 50       FOTO: initial eval, 6th visit     Time In: 345pm  Time Out: 445pm  Total treatment time: 60 minutes  Total Timed minutes: 45 minutes     Precautions:  Standard    Subjective     Pt reports: " I feel like I can bend it more. I used it a lot this weekend. I guess I have just figured out to use it or get around using it. "      he was compliant with home exercise program given last session.   Response to previous treatment:continued pain, swelling, stiffness  Functional change: none reported    Pain: 0/10  Location: right hand at rest, higher with activities    Objective        Edema. Measured in centimeters.    10/3/2019 10/3/2019 10/14/2019     Left Right Right   Long:          P1 6.7 7.5 7.5    PIP 6.7 8.0 7.9   P2 5.2 7.5 6.7    DIP 5.7 6.9 5.9            Hand ROM. Measured in degrees.  Ext measured laterally*     10/3/2019 10/3/2019 10/14/2019 10/21/2019 11/1/19 11/18/2019 11/20/2019 12/09/2019     Left Right Right Right Right Right Right Right     AROM AROM AROM AROM AROM AROM AROM AROM            *pre heat *post heat *pre heat *pre heat   Long:  MP 95 70 94 95 90 90 90 90               25/60 25/65 20/60 45/65 55/70 40/58 45/68              DIP 90 7/30 5/35 3/40 12/35 15/43 12/35 12/35              CANCINO 290 128 164 (+36) 172 (+8) 133 133 (=) 131 (-2) 136 " (+5)             At the end of session*    0/90  45/70  15/45  145                Morteza received the following supervised modalities after being cleared for contradictions for 15 minutes:     -Fluidotherapy: To right hand , continuous air, 115 deg, air speed 50 to decrease pain, edema & scar tissue, sensory re- education, and increased tissue extensibility prior to therex              Morteza received the following manual therapy techniques for 15 minutes:   -Performed RM to right index finger to decrease edema, improve joint mobility, decrease pain and improve lymphatic drainage to increase hand function.   -use of iastm tool for deep tissue massage to volar flexors  -cross friction massage to palmar flexor about A1 pulley  - gentle grade II joint glides to PIP  - intrinsic stretches to PIP, DIP        Morteza received therapeutic exercises for 30 minutes including:  -  AROM   DIP blocking  PIP blocking  TGE  FDS isolated    X 10 reps each    Hook with MP block  x 3 min    Isospheres X 3 min   small pom pom p/u LF to palm x 3 containers   Med pom pom hook X 3 containers     Peach Putty   X 10 squeezes  X 10 raking/reverse raking  X 10 isolated flexion   Digit abd/add   X 20 with blue foam/Yellow rubber band   Velcro board pushing and pulling small dowel X 3min           Home Exercises and Education Provided     Education provided: cont HEP, continue milla strap, LMB size C on 30 min 2x day, static orthosis at night per [place] CONNELL as instructed  - Progress towards goals     Written Home Exercises Provided: Patient instructed to cont prior HEP.  Exercises were reviewed and Morteza was able to demonstrate them prior to the end of the session.  Morteza demonstrated good  understanding of the education provided.   .   See EMR under Patient Instructions for exercises provided initial eval.     Assessment   Pt. Wearing the extension orthosis more.  He has more flexion this date. He has less pain over all. He is able  to use the hand more but isn't sure if he is just compensating with other  patterns. Pt. Cont to present with symptoms consistent with volar plate adherence. Pt. Reports continued swelling and pain and limitations to daily use. Pt. Participated with  pain increase with exercises and during manual therapy this date. He is to continue wearing the DynaSplint as instructed. He is using resisted theraputty to aid in pull through of the flexor tendons.       Morteza is progressing well towards his goals and there are no updates to goals at this time. Pt prognosis continues as Excellent. Pt will continue to benefit from skilled outpatient occupational therapy to address the deficits listed in the problem list on initial evaluation, provide pt/family education and to maximize pt's level of independence in the home and community environment.     Anticipated barriers to continued occupational therapy: high grade injury    Pt's spiritual, cultural and educational needs considered and pt agreeable to plan of care and goals.    Goals      Goals:   The following goals were discussed with the patient and patient is in agreement with them as to be addressed in the treatment plan.   Long Term Goals (LTGs); to be met by discharge.  LTG #1: Pt will report a pain level of 0 out of 10 with full gripping   -progressing    LTG #2: Pt will demo improved FOTO score by 20 points.  -progressing  LTG #3: Pt will return to prior level of function for ADLs and household management.  -progressing     Short Term Goals (STGs); to be met within 4 weeks (11/01/2019).  STG #1a: Pt will report 2 out of 10 pain level with full active range of motion.. -progressing  STG #2a: Pt will report/demo [place] with tying grocery bags for diaper disposal -progressing  STG #2b: Pt will report/demo [place] with using fork and knife for feeding. -progressing  STG #3a: Pt will demonstrate independence with issued HEP.  -progressing  STG #3b: Pt will  demo improved Right LF CANCINO by 50  degrees needed to aid with lifting and carrying young children. -progressing       Plan     Continue skilled occupational therapy with individualized plan of care focusing on increasing AROM and strength required for ADLs      Updates/Grading for next session: cont to progress as able    Sheba Hall OTR/L,CHT

## 2019-12-11 ENCOUNTER — CLINICAL SUPPORT (OUTPATIENT)
Dept: REHABILITATION | Facility: HOSPITAL | Age: 44
End: 2019-12-11
Payer: COMMERCIAL

## 2019-12-11 DIAGNOSIS — M25.641 FINGER STIFFNESS, RIGHT: ICD-10-CM

## 2019-12-11 DIAGNOSIS — Z78.9 DECREASED INDEPENDENCE WITH ACTIVITIES OF DAILY LIVING: ICD-10-CM

## 2019-12-11 DIAGNOSIS — M79.644 PAIN IN RIGHT FINGER(S): ICD-10-CM

## 2019-12-11 PROCEDURE — 97022 WHIRLPOOL THERAPY: CPT | Mod: PN

## 2019-12-11 PROCEDURE — 97140 MANUAL THERAPY 1/> REGIONS: CPT | Mod: PN

## 2019-12-11 PROCEDURE — 97110 THERAPEUTIC EXERCISES: CPT | Mod: PN

## 2019-12-11 NOTE — PROGRESS NOTES
"  Occupational Therapy Daily Treatment Note      Date: 12/11/2019  Name: Morteza Cerrato  Clinic Number: [id number]    Therapy Diagnosis:   Encounter Diagnoses   Name Primary?    Pain in right finger(s)     Finger stiffness, right     Decreased independence with activities of daily living      Physician: Kenzie Watkins PA-C    Physician Orders: eval and treat  Medical Diagnosis: LF PIP dorsal dislocation   Date of Injury: 08//31/2019  Evaluation Date: 10/03/2019  Insurance Authorization Period Expiration: 12/31/2019  Plan of Care Certification Period: 11/29/2019-01/24/2020  Date of Return to MD: 12/23/2019     Visit # / Visits authorized: 14 / 50       FOTO: initial eval, 6th visit     Time In: 345pm  Time Out: 445pm  Total treatment time: 60 minutes  Total Timed minutes: 45 minutes     Precautions:  Standard    Subjective     Pt reports: " It was pretty sore after the last session "      he was compliant with home exercise program given last session.   Response to previous treatment:continued pain, swelling, stiffness  Functional change: none reported    Pain: 0/10  Location: right hand at rest, higher with activities    Objective        Edema. Measured in centimeters.    10/3/2019 10/3/2019 10/14/2019     Left Right Right   Long:          P1 6.7 7.5 7.5    PIP 6.7 8.0 7.9   P2 5.2 7.5 6.7    DIP 5.7 6.9 5.9            Hand ROM. Measured in degrees.  Ext measured laterally*     10/3/2019 10/3/2019 10/14/2019 10/21/2019 11/1/19 11/18/2019 11/20/2019 12/09/2019     Left Right Right Right Right Right Right Right     AROM AROM AROM AROM AROM AROM AROM AROM            *pre heat *post heat *pre heat *pre heat   Long:  MP 95 70 94 95 90 90 90 90               25/60 25/65 20/60 45/65 55/70 40/58 45/68              DIP 90 7/30 5/35 3/40 12/35 15/43 12/35 12/35              CANCINO 290 128 164 (+36) 172 (+8) 133 133 (=) 131 (-2) 136 (+5)             At the end of session*    0/90  45/70  15/45  145   "              Morteza received the following supervised modalities after being cleared for contradictions for 15 minutes:     -Fluidotherapy: To right hand , continuous air, 115 deg, air speed 50 to decrease pain, edema & scar tissue, sensory re- education, and increased tissue extensibility prior to therex              Morteza received the following manual therapy techniques for 15 minutes:   -Performed RM to right index finger to decrease edema, improve joint mobility, decrease pain and improve lymphatic drainage to increase hand function.   -use of iastm tool for deep tissue massage to volar flexors  -cross friction massage to palmar flexor about A1 pulley  - gentle grade II joint glides to PIP  - intrinsic stretches to PIP, DIP        Morteza received therapeutic exercises for 30 minutes including:  -  AROM   DIP blocking  PIP blocking  TGE  FDS isolated    X 10 reps each    Hook with MP block  x 3 min    Isospheres X 3 min   small pom pom p/u LF to palm x 3 containers   Med pom pom hook X 3 containers     Peach Putty   X 10 squeezes  X 10 raking/reverse raking  X 10 isolated flexion   Digit abd/add   X 20 with blue foam/Yellow rubber band   Velcro board pushing and pulling small dowel X 3min           Home Exercises and Education Provided     Education provided: cont HEP, continue milla strap, LMB size C on 30 min 2x day, static orthosis at night per [place] CONNELL as instructed  - Progress towards goals     Written Home Exercises Provided: Patient instructed to cont prior HEP.  Exercises were reviewed and Morteza was able to demonstrate them prior to the end of the session.  Morteza demonstrated good  understanding of the education provided.   .   See EMR under Patient Instructions for exercises provided initial eval.     Assessment   Pt. Wearing the extension orthosis more.  He  Was sore after last session but has resolved now.  He is able to use the hand more but isn't sure if he is just compensating with  other  patterns. Pt. Cont to present with symptoms consistent with volar plate adherence. Pt. Reports continued swelling and pain and limitations to daily use. Pt. Participated with  pain increase with exercises and during manual therapy this date. He is to continue wearing the DynaSplint as instructed. He is using resisted theraputty to aid in pull through of the flexor tendons.   He states the pain is lessening and the exercises in session seem like he is getting more motion.     Morteza is progressing well towards his goals and there are no updates to goals at this time. Pt prognosis continues as Excellent. Pt will continue to benefit from skilled outpatient occupational therapy to address the deficits listed in the problem list on initial evaluation, provide pt/family education and to maximize pt's level of independence in the home and community environment.     Anticipated barriers to continued occupational therapy: high grade injury    Pt's spiritual, cultural and educational needs considered and pt agreeable to plan of care and goals.    Goals      Goals:   The following goals were discussed with the patient and patient is in agreement with them as to be addressed in the treatment plan.   Long Term Goals (LTGs); to be met by discharge.  LTG #1: Pt will report a pain level of 0 out of 10 with full gripping   -progressing    LTG #2: Pt will demo improved FOTO score by 20 points.  -progressing  LTG #3: Pt will return to prior level of function for ADLs and household management.  -progressing     Short Term Goals (STGs); to be met within 4 weeks (11/01/2019).  STG #1a: Pt will report 2 out of 10 pain level with full active range of motion.. -progressing  STG #2a: Pt will report/demo [place] with tying grocery bags for diaper disposal -progressing  STG #2b: Pt will report/demo [place] with using fork and knife for feeding. -progressing  STG #3a: Pt will demonstrate independence with issued HEP.   -progressing  STG #3b: Pt will demo improved Right LF CANCINO by 50  degrees needed to aid with lifting and carrying young children. -progressing       Plan     Continue skilled occupational therapy with individualized plan of care focusing on increasing AROM and strength required for ADLs      Updates/Grading for next session: cont to progress as able    Sheba Hall OTR/L,CHT

## 2019-12-16 ENCOUNTER — CLINICAL SUPPORT (OUTPATIENT)
Dept: REHABILITATION | Facility: HOSPITAL | Age: 44
End: 2019-12-16
Payer: COMMERCIAL

## 2019-12-16 DIAGNOSIS — M25.641 FINGER STIFFNESS, RIGHT: ICD-10-CM

## 2019-12-16 DIAGNOSIS — M79.644 PAIN IN RIGHT FINGER(S): ICD-10-CM

## 2019-12-16 DIAGNOSIS — Z78.9 DECREASED INDEPENDENCE WITH ACTIVITIES OF DAILY LIVING: ICD-10-CM

## 2019-12-16 PROCEDURE — 97110 THERAPEUTIC EXERCISES: CPT | Mod: PN

## 2019-12-16 PROCEDURE — 97140 MANUAL THERAPY 1/> REGIONS: CPT | Mod: PN

## 2019-12-16 PROCEDURE — 97022 WHIRLPOOL THERAPY: CPT | Mod: PN

## 2019-12-16 NOTE — PROGRESS NOTES
"  Occupational Therapy Daily Treatment Note      Date: 12/16/2019  Name: Morteza Cerrato  Clinic Number: [id number]    Therapy Diagnosis:   Encounter Diagnoses   Name Primary?    Pain in right finger(s)     Finger stiffness, right     Decreased independence with activities of daily living      Physician:  Dr. Rufino Darden   Physician Orders: eval and treat  Medical Diagnosis: LF PIP dorsal dislocation   Date of Injury: 08//31/2019  Evaluation Date: 10/03/2019  Insurance Authorization Period Expiration: 12/31/2019  Plan of Care Certification Period: 11/29/2019-01/24/2020  Date of Return to MD: 12/23/2019     Visit # / Visits authorized: 15 / 50       FOTO: initial eval, 6th visit     Time In: 355pm  Time Out: 445pm  Total treatment time: 60 minutes  Total Timed minutes: 45 minutes     Precautions:  Standard    Subjective     Pt reports: " It was pretty sore after the last session "      he was compliant with home exercise program given last session.   Response to previous treatment:continued pain, swelling, stiffness  Functional change: none reported    Pain: 0/10  Location: right hand at rest, higher with activities    Objective        Edema. Measured in centimeters.    10/3/2019 10/3/2019 10/14/2019     Left Right Right   Long:          P1 6.7 7.5 7.5    PIP 6.7 8.0 7.9   P2 5.2 7.5 6.7    DIP 5.7 6.9 5.9            Hand ROM. Measured in degrees.  Ext measured laterally*     10/3/2019 10/3/2019 10/14/2019 10/21/2019 11/1/19 11/18/2019 11/20/2019 12/09/2019     Left Right Right Right Right Right Right Right     AROM AROM AROM AROM AROM AROM AROM AROM            *pre heat *post heat *pre heat *pre heat   Long:  MP 95 70 94 95 90 90 90 90               25/60 25/65 20/60 45/65 55/70 40/58 45/68              DIP 90 7/30 5/35 3/40 12/35 15/43 12/35 12/35              CANCINO 290 128 164 (+36) 172 (+8) 133 133 (=) 131 (-2) 136 (+5)             At the end of session*    0/90  45/70  15/45  145                Morteza " received the following supervised modalities after being cleared for contradictions for 15 minutes:     -Fluidotherapy: To right hand , continuous air, 115 deg, air speed 50 to decrease pain, edema & scar tissue, sensory re- education, and increased tissue extensibility prior to therex              Morteza received the following manual therapy techniques for 15 minutes:   -Performed RM to right index finger to decrease edema, improve joint mobility, decrease pain and improve lymphatic drainage to increase hand function.   -use of iastm tool for deep tissue massage to volar flexors  -cross friction massage to palmar flexor about A1 pulley  - gentle grade II joint glides to PIP  - intrinsic stretches to PIP, DIP        Morteza received therapeutic exercises for 30 minutes including:  -  AROM   DIP blocking  PIP blocking  TGE  FDS isolated    X 10 reps each    Hook with MP block  x 3 min    Isospheres X 3 min   small pom pom p/u LF to palm x 3 containers   Med pom pom hook X 3 containers     Peach Putty   X 10 squeezes  X 10 raking/reverse raking  X 10 isolated flexion   Digit add   X 20 with blue foam   Velcro board pushing and pulling small dowel X 3min           Home Exercises and Education Provided     Education provided: cont HEP, continue milla strap, LMB size C on 30 min 2x day, static orthosis at night per [place] CONNELL as instructed  - Progress towards goals     Written Home Exercises Provided: Patient instructed to cont prior HEP.  Exercises were reviewed and Morteza was able to demonstrate them prior to the end of the session.  Morteza demonstrated good  understanding of the education provided.   .   See EMR under Patient Instructions for exercises provided initial eval.     Assessment   Pt. Reports he has less pain and he feels he can bend it more. He had trouble donning a glove for work around the house over the weekend.  He is able to use the hand more but isn't sure if he is just compensating with other   patterns. Pt. Cont to present with symptoms consistent with volar plate adherence. Pt. Participated with  pain increase with exercises and during manual therapy this date. He is to continue wearing the DynaSplint as instructed. He is using resisted theraputty to aid in pull through of the flexor tendons.   He states the pain is lessening and the exercises in session seem like he is getting more motion.     Morteza is progressing well towards his goals and there are no updates to goals at this time. Pt prognosis continues as Excellent. Pt will continue to benefit from skilled outpatient occupational therapy to address the deficits listed in the problem list on initial evaluation, provide pt/family education and to maximize pt's level of independence in the home and community environment.     Anticipated barriers to continued occupational therapy: high grade injury    Pt's spiritual, cultural and educational needs considered and pt agreeable to plan of care and goals.    Goals      Goals:   The following goals were discussed with the patient and patient is in agreement with them as to be addressed in the treatment plan.   Long Term Goals (LTGs); to be met by discharge.  LTG #1: Pt will report a pain level of 0 out of 10 with full gripping   -progressing    LTG #2: Pt will demo improved FOTO score by 20 points.  -progressing  LTG #3: Pt will return to prior level of function for ADLs and household management.  -progressing     Short Term Goals (STGs); to be met within 4 weeks (11/01/2019).  STG #1a: Pt will report 2 out of 10 pain level with full active range of motion.. -progressing  STG #2a: Pt will report/demo [place] with tying grocery bags for diaper disposal -progressing  STG #2b: Pt will report/demo [place] with using fork and knife for feeding. -progressing  STG #3a: Pt will demonstrate independence with issued HEP.  -progressing  STG #3b: Pt will demo improved Right LF CANCINO by 50  degrees needed  to aid with lifting and carrying young children. -progressing       Plan     Continue skilled occupational therapy with individualized plan of care focusing on increasing AROM and strength required for ADLs      Updates/Grading for next session: cont to progress as able    Sheba Hall OTR/L,CHT

## 2019-12-18 ENCOUNTER — CLINICAL SUPPORT (OUTPATIENT)
Dept: REHABILITATION | Facility: HOSPITAL | Age: 44
End: 2019-12-18
Payer: COMMERCIAL

## 2019-12-18 ENCOUNTER — DOCUMENTATION ONLY (OUTPATIENT)
Dept: REHABILITATION | Facility: HOSPITAL | Age: 44
End: 2019-12-18

## 2019-12-18 DIAGNOSIS — M79.644 PAIN IN RIGHT FINGER(S): ICD-10-CM

## 2019-12-18 DIAGNOSIS — Z78.9 DECREASED INDEPENDENCE WITH ACTIVITIES OF DAILY LIVING: ICD-10-CM

## 2019-12-18 DIAGNOSIS — M25.641 FINGER STIFFNESS, RIGHT: ICD-10-CM

## 2019-12-18 PROCEDURE — 97110 THERAPEUTIC EXERCISES: CPT | Mod: PN

## 2019-12-18 PROCEDURE — 97140 MANUAL THERAPY 1/> REGIONS: CPT | Mod: PN

## 2019-12-18 PROCEDURE — 97022 WHIRLPOOL THERAPY: CPT | Mod: PN

## 2019-12-18 NOTE — PROGRESS NOTES
"  Occupational Therapy Daily Treatment Note      Date: 12/18/2019  Name: Morteza Cerrato  Clinic Number: [id number]    Therapy Diagnosis:   Encounter Diagnoses   Name Primary?    Pain in right finger(s)     Finger stiffness, right     Decreased independence with activities of daily living      Physician:  Dr. Rufino Darden   Physician Orders: eval and treat  Medical Diagnosis: LF PIP dorsal dislocation   Date of Injury: 08//31/2019  Evaluation Date: 10/03/2019  Insurance Authorization Period Expiration: 12/31/2019  Plan of Care Certification Period: 11/29/2019-01/24/2020  Date of Return to MD: 12/23/2019     Visit # / Visits authorized: 17 / 50       FOTO: initial eval, 6th visit     Time In: 355pm  Time Out: 455pm  Total treatment time: 60 minutes  Total Timed minutes: 45 minutes     Precautions:  Standard    Subjective     Pt reports: " I feels stiff today with the cold weather"      he was compliant with home exercise program given last session.   Response to previous treatment:continued pain, swelling, stiffness  Functional change: none reported    Pain: 0/10  Location: right hand at rest, higher with activities    Objective        Edema. Measured in centimeters.    10/3/2019 10/3/2019 10/14/2019 12/18/2019     Left Right Right Right   Long:           P1 6.7 7.5 7.5 7.0    PIP 6.7 8.0 7.9 7.6   P2 5.2 7.5 6.7 6.6    DIP 5.7 6.9 5.9 5.8            Hand ROM. Measured in degrees.  Ext measured laterally*     10/3/2019 10/3/2019 10/14/2019 10/21/2019 11/1/19 11/18/2019 11/20/2019 12/09/2019 12/18/2019     Left Right Right Right Right Right Right Right Right     AROM AROM AROM AROM AROM AROM AROM AROM  AROM           *pre heat *post heat *pre heat *pre heat *pre  heat   Long:  MP 95 70 94 95 90 90 90 90 92               25/60 25/65 20/60 45/65 55/70 40/58 45/68 44/72              DIP 90 7/30 5/35 3/40 12/35 15/43 12/35 12/35 10/36              CANCINO 290 128 164 (+36) 172 (+8) 133 133 (=) 131 (-2) 136 (+5) 146 " (+10)             At the end of session*    0/90  45/70  15/45  145   At end of session*     0/94  35/75  10/48  172 (+27)            Strength: (in pounds/psi)        Date 12/18/2019 12/18/2019    Left Right   Rung  55            Morteza received the following supervised modalities after being cleared for contradictions for 15 minutes:     -Fluidotherapy: To right hand , continuous air, 115 deg, air speed 50 to decrease pain, edema & scar tissue, sensory re- education, and increased tissue extensibility prior to therex              Morteza received the following manual therapy techniques for 15 minutes:   -Performed RM to right index finger to decrease edema, improve joint mobility, decrease pain and improve lymphatic drainage to increase hand function.   -use of iasMavin tool for deep tissue massage to volar flexors  -cross friction massage to palmar flexor about A1 pulley  - gentle grade II joint glides to PIP  - intrinsic stretches to PIP, DIP        Morteza received therapeutic exercises for 30 minutes including:  -  AROM   DIP blocking  PIP blocking  TGE  FDS isolated    X 10 reps each    Hook with MP block  x 3 min     5.0 # digi-flex X 20 reps isolated    small pom pom p/u LF to palm x 3 containers   Med pom pom hook X 3 containers     Peach Putty   X 10 squeezes  X 10 raking/reverse raking  X 10 isolated flexion   Digit add   X 20 with blue foam   Velcro board pushing and pulling small dowel X 3min           Home Exercises and Education Provided     Education provided: cont HEP, continue milla strap, LMB size C on 30 min 2x day, static orthosis at night per [place] CONNELL as instructed  - Progress towards goals     Written Home Exercises Provided: Patient instructed to cont prior HEP.  Exercises were reviewed and Morteza was able to demonstrate them prior to the end of the session.  Morteza demonstrated good  understanding of the education provided.   .   See EMR under Patient Instructions for  exercises provided initial eval.     Assessment   Pt. Reports he has less pain and he feels he can bend it more. AROM with slight increases in CANCINO however pt without changes in PIP or DIP  Extension.  strength taken with ~60% deficit- of note pt is left handed. Pt. Cont to present with symptoms consistent with volar plate adherence. Pt. Participated with  pain increase with exercises and during manual therapy this date. He is to continue wearing the DynaSplint as instructed. He is using resisted theraputty to aid in pull through of the flexor tendons.   He states the pain is lessening and the exercises in session seem like he is getting more motion.     Morteza is progressing well towards his goals and there are no updates to goals at this time. Pt prognosis continues as Excellent. Pt will continue to benefit from skilled outpatient occupational therapy to address the deficits listed in the problem list on initial evaluation, provide pt/family education and to maximize pt's level of independence in the home and community environment.     Anticipated barriers to continued occupational therapy: high grade injury    Pt's spiritual, cultural and educational needs considered and pt agreeable to plan of care and goals.    Goals      Goals:   The following goals were discussed with the patient and patient is in agreement with them as to be addressed in the treatment plan.   Long Term Goals (LTGs); to be met by discharge.  LTG #1: Pt will report a pain level of 0 out of 10 with full gripping   -progressing    LTG #2: Pt will demo improved FOTO score by 20 points.  -progressing  LTG #3: Pt will return to prior level of function for ADLs and household management.  -progressing     Short Term Goals (STGs); to be met within 4 weeks (11/01/2019).  STG #1a: Pt will report 2 out of 10 pain level with full active range of motion.. -progressing  STG #2a: Pt will report/demo [place] with tying grocery bags for diaper  disposal -progressing  STG #2b: Pt will report/demo [place] with using fork and knife for feeding. -progressing  STG #3a: Pt will demonstrate independence with issued HEP.  -progressing  STG #3b: Pt will demo improved Right LF CANCINO by 50  degrees needed to aid with lifting and carrying young children. -progressing       Plan     Continue skilled occupational therapy with individualized plan of care focusing on increasing AROM and strength required for ADLs      Updates/Grading for next session: cont to progress as able    Sheba Hall, OTR/L,CHT

## 2019-12-18 NOTE — PROGRESS NOTES
OCCUPATIONAL THERAPY RETURN TO DOCTOR PROGRESS NOTE  Patient: Morteza Cerrato  MRN: [id number]  Date of Evaluation: 10/03/2019  Referring Physician:  Dr. Rufino Bishop MD visit: 12/24/2019  Primary Diagnosis: S63.252A (ICD-10-CM) - Closed dislocation of right middle finger  Treatment Diagnosis:   1. Pain in right finger(s)     2. Finger stiffness, right     3. Decreased independence with activities of daily living       Date of Injury: 08/3/2019    TOTAL VISITS: 17    Pt. Was evaluated by skilled OT 4.5 weeks post injury. He has been wearing both flexion and extension dynasplints and he is wearing a static extension orthosis at night.He demonstrates symptoms consistent with volar plate adherence. He is tolerating manual therapy interventions including joint glides, mobilization with movement, intrinsic and extrinsic stretches, use of ASTYM tools and passive motion- with less pain reported. He has been able to use the hand more but feels he is using a modified grasp. He feels he is progressing well with flexion but not with extension.  strength demonstrates deficit as noted below.     Please see objective measurements below for detailed measurements.     Please contact me with regards to further instructions, Thank you,    Sheba Hall, OTR/L, CHT  Occupational therapist, Certified Hand Therapist  [facility]  [phone] phone  [phone] fax  [email or web address]    Objective:   Edema. Measured in centimeters.    10/3/2019 10/3/2019 10/14/2019 12/18/2019     Left Right Right Right   Long:           P1 6.7 7.5 7.5 7.0 (-0.5)    PIP 6.7 8.0 7.9 7.6 (-0.3)   P2 5.2 7.5 6.7 6.6 (-0.1)    DIP 5.7 6.9 5.9 5.8 (-0.1)            Hand ROM. Measured in degrees.  Ext measured laterally*     10/3/2019 10/3/2019 10/14/2019 10/21/2019 11/1/19 11/18/2019 11/20/2019 12/09/2019 12/18/2019     Left Right Right Right Right Right Right Right Right     AROM AROM AROM AROM AROM AROM AROM AROM   AROM           *pre heat *post heat *pre heat *pre heat *pre  heat   Long:  MP 95 70 94 95 90 90 90 90 92               25/60 25/65 20/60 45/65 55/70 40/58 45/68 44/72              DIP 90 7/30 5/35 3/40 12/35 15/43 12/35 12/35 10/36              CANCINO 290 128 164 (+36) 172 (+8) 133 133 (=) 131 (-2) 136 (+5) 146 (+10)             At the end of session*    0/90  45/70  15/45  145     At end of session*     0/94  35/75  10/48  172 (+27)               Strength: (in pounds/psi)        Date 12/18/2019 12/18/2019    Left Right   Rung  55

## 2019-12-23 ENCOUNTER — CLINICAL SUPPORT (OUTPATIENT)
Dept: REHABILITATION | Facility: HOSPITAL | Age: 44
End: 2019-12-23
Payer: COMMERCIAL

## 2019-12-23 DIAGNOSIS — M25.641 FINGER STIFFNESS, RIGHT: ICD-10-CM

## 2019-12-23 DIAGNOSIS — Z78.9 DECREASED INDEPENDENCE WITH ACTIVITIES OF DAILY LIVING: ICD-10-CM

## 2019-12-23 DIAGNOSIS — M79.644 PAIN IN RIGHT FINGER(S): ICD-10-CM

## 2019-12-23 PROCEDURE — 97140 MANUAL THERAPY 1/> REGIONS: CPT | Mod: PN

## 2019-12-23 PROCEDURE — 97022 WHIRLPOOL THERAPY: CPT | Mod: PN

## 2019-12-23 PROCEDURE — 97110 THERAPEUTIC EXERCISES: CPT | Mod: PN

## 2019-12-23 NOTE — PROGRESS NOTES
"  Occupational Therapy Daily Treatment Note      Date: 12/23/2019  Name: Morteza Cerrato  Clinic Number: [id number]    Therapy Diagnosis:   Encounter Diagnoses   Name Primary?    Pain in right finger(s)     Finger stiffness, right     Decreased independence with activities of daily living      Physician:  Dr. Rufino Darden   Physician Orders: eval and treat  Medical Diagnosis: LF PIP dorsal dislocation   Date of Injury: 08//31/2019  Evaluation Date: 10/03/2019  Insurance Authorization Period Expiration: 12/31/2019  Plan of Care Certification Period: 11/29/2019-01/24/2020  Date of Return to MD: 12/23/2019     Visit # / Visits authorized: 18 / 50       FOTO: initial eval, 6th visit     Time In: 400pm  Time Out: 500pm  Total treatment time: 60 minutes  Total Timed minutes: 45 minutes     Precautions:  Standard    Subjective     Pt reports: "The doctor is wanting 4 more weeks of therapy and to stop wearing the flexion splint"         he was compliant with home exercise program given last session.   Response to previous treatment:continued pain, swelling, stiffness  Functional change: modified  for most activities, cannot put hand flat or put hand into pocket.     Pain: 6/10  Location: right hand at rest, higher with activities    Objective        Edema. Measured in centimeters.    10/3/2019 10/3/2019 10/14/2019 12/18/2019     Left Right Right Right   Long:           P1 6.7 7.5 7.5 7.0    PIP 6.7 8.0 7.9 7.6   P2 5.2 7.5 6.7 6.6    DIP 5.7 6.9 5.9 5.8            Hand ROM. Measured in degrees.  Ext measured laterally*     10/3/2019 10/3/2019 10/14/2019 10/21/2019 11/1/19 11/18/2019 11/20/2019 12/09/2019 12/18/2019     Left Right Right Right Right Right Right Right Right     AROM AROM AROM AROM AROM AROM AROM AROM  AROM           *pre heat *post heat *pre heat *pre heat *pre  heat   Long:  MP 95 70 94 95 90 90 90 90 92               25/60 25/65 20/60 45/65 55/70 40/58 45/68 44/72              DIP 90 7/30 5/35 " 3/40 12/35 15/43 12/35 12/35 10/36              CANCINO 290 128 164 (+36) 172 (+8) 133 133 (=) 131 (-2) 136 (+5) 146 (+10)             At the end of session*    0/90  45/70  15/45  145   At end of session*     0/94  35/75  10/48  172 (+27)            Strength: (in pounds/psi)        Date 12/18/2019 12/18/2019    Left Right   Rung  55            Morteza received the following supervised modalities after being cleared for contradictions for 15 minutes:     -Fluidotherapy: To right hand , continuous air, 115 deg, air speed 50 to decrease pain, edema & scar tissue, sensory re- education, and increased tissue extensibility prior to therex              Morteza received the following manual therapy techniques for 15 minutes:   -Performed RM to right index finger to decrease edema, improve joint mobility, decrease pain and improve lymphatic drainage to increase hand function.   -use of Clacendix tool for deep tissue massage to volar flexors  -cross friction massage to palmar flexor about A1 pulley  - gentle grade II joint glides to PIP  - intrinsic stretches to PIP, DIP        Morteza received therapeutic exercises for 30 minutes including:  -  AROM   DIP blocking  PIP blocking  TGE  FDS isolated    X 10 reps each    Hook with MP block  x 3 min     5.0 # digi-flex X 20 reps isolated    small pom pom p/u LF to palm x 3 containers   Med pom pom hook X 3 containers     Peach Putty   X 10 squeezes  X 10 raking/reverse raking  X 10 isolated flexion   Digit add   X 20 with blue foam   Velcro board pushing and pulling small dowel X 3min           Home Exercises and Education Provided     Education provided: cont HEP, continue milla strap, LMB size C on 30 min 2x day, static orthosis at night per [place] CONNELL as instructed  - Progress towards goals     Written Home Exercises Provided: Patient instructed to cont prior HEP.  Exercises were reviewed and Morteza was able to demonstrate them prior to the end of the session.  Morteza  demonstrated good  understanding of the education provided.   .   See EMR under Patient Instructions for exercises provided initial eval.     Assessment   Pt. Reports he has a lot of pain today after seeing the doctor and the doctor. Pt. Cont to present with symptoms consistent with volar plate adherence. Pt. Participated with  pain increase with exercises and during manual therapy this date. He is to continue wearing the DynaSplint as instructed. He is using resisted theraputty to aid in pull through of the flexor tendons.   He states the pain is lessening and the exercises in session seem like he is getting more motion.     Morteza is progressing well towards his goals and there are no updates to goals at this time. Pt prognosis continues as Excellent. Pt will continue to benefit from skilled outpatient occupational therapy to address the deficits listed in the problem list on initial evaluation, provide pt/family education and to maximize pt's level of independence in the home and community environment.     Anticipated barriers to continued occupational therapy: high grade injury    Pt's spiritual, cultural and educational needs considered and pt agreeable to plan of care and goals.    Goals      Goals:   The following goals were discussed with the patient and patient is in agreement with them as to be addressed in the treatment plan.   Long Term Goals (LTGs); to be met by discharge.  LTG #1: Pt will report a pain level of 0 out of 10 with full gripping   -progressing    LTG #2: Pt will demo improved FOTO score by 20 points.  -progressing  LTG #3: Pt will return to prior level of function for ADLs and household management.  -progressing     Short Term Goals (STGs); to be met within 4 weeks (11/01/2019).  STG #1a: Pt will report 2 out of 10 pain level with full active range of motion.. -progressing  STG #2a: Pt will report/demo [place] with tying grocery bags for diaper disposal -progressing  STG #2b: Pt will  report/demo [place] with using fork and knife for feeding. -progressing  STG #3a: Pt will demonstrate independence with issued HEP.  -progressing  STG #3b: Pt will demo improved Right LF CANCINO by 50  degrees needed to aid with lifting and carrying young children. -progressing       Plan     Continue skilled occupational therapy with individualized plan of care focusing on increasing AROM and strength required for ADLs      Updates/Grading for next session: cont to progress as able    Sheba Hall OTR/L,CHT

## 2020-01-08 ENCOUNTER — CLINICAL SUPPORT (OUTPATIENT)
Dept: REHABILITATION | Facility: HOSPITAL | Age: 45
End: 2020-01-08
Payer: COMMERCIAL

## 2020-01-08 DIAGNOSIS — M25.641 FINGER STIFFNESS, RIGHT: ICD-10-CM

## 2020-01-08 DIAGNOSIS — M79.644 PAIN IN RIGHT FINGER(S): ICD-10-CM

## 2020-01-08 DIAGNOSIS — Z78.9 DECREASED INDEPENDENCE WITH ACTIVITIES OF DAILY LIVING: ICD-10-CM

## 2020-01-08 PROCEDURE — 97022 WHIRLPOOL THERAPY: CPT | Mod: PN

## 2020-01-08 PROCEDURE — 97140 MANUAL THERAPY 1/> REGIONS: CPT | Mod: PN

## 2020-01-08 PROCEDURE — 97110 THERAPEUTIC EXERCISES: CPT | Mod: PN

## 2020-01-08 NOTE — PROGRESS NOTES
"  Occupational Therapy Daily Treatment Note      Date: 1/8/2020  Name: Morteza Cerrato  Clinic Number: [id number]    Therapy Diagnosis:   Encounter Diagnoses   Name Primary?    Pain in right finger(s)     Finger stiffness, right     Decreased independence with activities of daily living      Physician:  Dr. Rufino Darden   Physician Orders: eval and treat  Medical Diagnosis: LF PIP dorsal dislocation   Date of Injury: 08//31/2019  Evaluation Date: 10/03/2019  Insurance Authorization Period Expiration: 12/31/2019  Plan of Care Certification Period: 11/29/2019-01/24/2020  Date of Return to MD: 02/03/2020     Visit # / Visits authorized: 19 / 50       FOTO: initial eval, 6th visit     Time In: 500pm  Time Out: 600pm  Total treatment time: 60 minutes  Total Timed minutes: 45 minutes     Precautions:  Standard    Subjective     Pt reports: "I don't think much is different"         he was compliant with home exercise program given last session.   Response to previous treatment:continued pain, swelling, stiffness  Functional change: modified  for most activities, cannot put hand flat or put hand into pocket.     Pain: 6/10  Location: right hand at rest, higher with activities    Objective        Edema. Measured in centimeters.    10/3/2019 10/3/2019 10/14/2019 12/18/2019     Left Right Right Right   Long:           P1 6.7 7.5 7.5 7.0    PIP 6.7 8.0 7.9 7.6   P2 5.2 7.5 6.7 6.6    DIP 5.7 6.9 5.9 5.8            Hand ROM. Measured in degrees.  Ext measured laterally*     10/3/2019 10/3/2019 10/14/2019 10/21/2019 11/1/19 11/18/2019 11/20/2019 12/09/2019 12/18/2019 01/08/2020     Left Right Right Right Right Right Right Right Right Right      AROM AROM AROM AROM AROM AROM AROM AROM  AROM AROM           *pre heat *post heat *pre heat *pre heat *pre  heat *pre heat   Long:  MP 95 70 94 95 90 90 90 90 92 97               25/60 25/65 20/60 45/65 55/70 40/58 45/68 44/72 45/70              DIP 90 7/30 5/35 3/40 12/35 " 15/43 12/35 12/35 10/36 10/36              CANCINO 290 128 164 (+36) 172 (+8) 133 133 (=) 131 (-2) 136 (+5) 146 (+10) 148 (+2)             At the end of session*    0/90  45/70  15/45  145   At end of session*     0/94  35/75  10/48  172 (+27)             Strength: (in pounds/psi)        Date 12/18/2019 12/18/2019 01/08/2020    Left Right Right   Rung  55 67 (+12)            Morteza received the following supervised modalities after being cleared for contradictions for 15 minutes:     -Fluidotherapy: To right hand , continuous air, 115 deg, air speed 50 to decrease pain, edema & scar tissue, sensory re- education, and increased tissue extensibility prior to therex              Morteza received the following manual therapy techniques for 15 minutes:   -Performed RM to right index finger to decrease edema, improve joint mobility, decrease pain and improve lymphatic drainage to increase hand function.   -use of iasRadisys tool for deep tissue massage to volar flexors  -cross friction massage to palmar flexor about A1 pulley  - gentle grade II joint glides to PIP  - intrinsic stretches to PIP, DIP        Morteza received therapeutic exercises for 30 minutes including:  -  AROM   DIP blocking  PIP blocking  TGE  FDS isolated    X 10 reps each    Hook with MP block  x 3 min     5.0 # digi-flex X 20 reps isolated    small pom pom p/u LF to palm x 3 containers   Med pom pom hook X 3 containers   Red/  Peach Putty   X 10 squeezes (Red)  X 10 raking/reverse raking (peach)  X 10 isolated flexion (red)   Digit add   X 20 with blue foam   Velcro board pushing and pulling small dowel X 3min           Home Exercises and Education Provided     Education provided: cont HEP, continue milla strap, LMB size C on 30 min 2x day, static orthosis at night per [place] CONNELL as instructed  - Progress towards goals     Written Home Exercises Provided: Patient instructed to cont prior HEP.  Exercises were reviewed and Morteza was able to  demonstrate them prior to the end of the session.  Morteza demonstrated good  understanding of the education provided.   .   See EMR under Patient Instructions for exercises provided initial eval.     Assessment   Pt. Has missed a few scheduled appointments over the holidays due to personal needs that arose. No real change in AROM noted. He is wearing the extension splint more and more. Pt. Cont to present with symptoms consistent with volar plate adherence. Pt. Participated with  pain increase with exercises and during manual therapy this date. He is to continue wearing the DynaSplint as instructed. He is using resisted theraputty to aid in pull through of the flexor tendons.       Morteza is progressing well towards his goals and there are no updates to goals at this time. Pt prognosis continues as Excellent. Pt will continue to benefit from skilled outpatient occupational therapy to address the deficits listed in the problem list on initial evaluation, provide pt/family education and to maximize pt's level of independence in the home and community environment.     Anticipated barriers to continued occupational therapy: high grade injury    Pt's spiritual, cultural and educational needs considered and pt agreeable to plan of care and goals.    Goals      Goals:   The following goals were discussed with the patient and patient is in agreement with them as to be addressed in the treatment plan.   Long Term Goals (LTGs); to be met by discharge.  LTG #1: Pt will report a pain level of 0 out of 10 with full gripping   -progressing    LTG #2: Pt will demo improved FOTO score by 20 points.  -progressing  LTG #3: Pt will return to prior level of function for ADLs and household management.  -progressing     Short Term Goals (STGs); to be met within 4 weeks (11/01/2019).  STG #1a: Pt will report 2 out of 10 pain level with full active range of motion.. -progressing  STG #2a: Pt will report/demo [place] with tying  grocery bags for diaper disposal -progressing  STG #2b: Pt will report/demo [place] with using fork and knife for feeding. -progressing  STG #3a: Pt will demonstrate independence with issued HEP.  -progressing  STG #3b: Pt will demo improved Right LF CANCINO by 50  degrees needed to aid with lifting and carrying young children. -progressing       Plan     Continue skilled occupational therapy with individualized plan of care focusing on increasing AROM and strength required for ADLs      Updates/Grading for next session: cont to progress as able    Sheba Hall OTR/L,CHT

## 2020-01-13 ENCOUNTER — CLINICAL SUPPORT (OUTPATIENT)
Dept: REHABILITATION | Facility: HOSPITAL | Age: 45
End: 2020-01-13
Payer: COMMERCIAL

## 2020-01-13 DIAGNOSIS — Z78.9 DECREASED INDEPENDENCE WITH ACTIVITIES OF DAILY LIVING: ICD-10-CM

## 2020-01-13 DIAGNOSIS — M25.641 FINGER STIFFNESS, RIGHT: ICD-10-CM

## 2020-01-13 DIAGNOSIS — M79.644 PAIN IN RIGHT FINGER(S): ICD-10-CM

## 2020-01-13 PROCEDURE — 97140 MANUAL THERAPY 1/> REGIONS: CPT | Mod: PN

## 2020-01-13 PROCEDURE — 97110 THERAPEUTIC EXERCISES: CPT | Mod: PN

## 2020-01-13 PROCEDURE — 97022 WHIRLPOOL THERAPY: CPT | Mod: PN

## 2020-01-13 NOTE — PROGRESS NOTES
"  Occupational Therapy Daily Treatment Note      Date: 1/13/2020  Name: Morteza Cerrato  Clinic Number: [id number]    Therapy Diagnosis:   Encounter Diagnoses   Name Primary?    Pain in right finger(s)     Finger stiffness, right     Decreased independence with activities of daily living      Physician:  Dr. Rufino Darden   Physician Orders: eval and treat  Medical Diagnosis: LF PIP dorsal dislocation   Date of Injury: 08//31/2019  Evaluation Date: 10/03/2019  Insurance Authorization Period Expiration: 12/31/2019  Plan of Care Certification Period: 11/29/2019-01/24/2020  Date of Return to MD: 02/03/2020     Visit # / Visits authorized: 20 / 50       FOTO: initial eval, 6th visit     Time In: 325pm  Time Out: 425pm  Total treatment time: 60 minutes  Total Timed minutes: 45 minutes     Precautions:  Standard    Subjective     Pt reports: "It's bending a little more ut not straightening as much"         he was compliant with home exercise program given last session.   Response to previous treatment:continued pain, swelling, stiffness  Functional change: modified  for most activities, cannot put hand flat or put hand into pocket.     Pain: 6/10  Location: right hand at rest, higher with activities    Objective        Edema. Measured in centimeters.    10/3/2019 10/3/2019 10/14/2019 12/18/2019     Left Right Right Right   Long:           P1 6.7 7.5 7.5 7.0    PIP 6.7 8.0 7.9 7.6   P2 5.2 7.5 6.7 6.6    DIP 5.7 6.9 5.9 5.8            Hand ROM. Measured in degrees.  Ext measured laterally*     10/3/2019 10/3/2019 10/14/2019 10/21/2019 11/1/19 11/18/2019 11/20/2019 12/09/2019 12/18/2019 01/08/2020     Left Right Right Right Right Right Right Right Right Right      AROM AROM AROM AROM AROM AROM AROM AROM  AROM AROM           *pre heat *post heat *pre heat *pre heat *pre  heat *pre heat   Long:  MP 95 70 94 95 90 90 90 90 92 97               25/60 25/65 20/60 45/65 55/70 40/58 45/68 44/72 45/70              DIP 90 " 7/30 5/35 3/40 12/35 15/43 12/35 12/35 10/36 10/36              CANCINO 290 128 164 (+36) 172 (+8) 133 133 (=) 131 (-2) 136 (+5) 146 (+10) 148 (+2)             At the end of session*    0/90  45/70  15/45  145   At end of session*     0/94  35/75  10/48  172 (+27)             Strength: (in pounds/psi)        Date 12/18/2019 12/18/2019 01/08/2020    Left Right Right   Rung  55 67 (+12)            Morteza received the following supervised modalities after being cleared for contradictions for 15 minutes:     -Fluidotherapy: To right hand , continuous air, 115 deg, air speed 50 to decrease pain, edema & scar tissue, sensory re- education, and increased tissue extensibility prior to therex              Morteza received the following manual therapy techniques for 15 minutes:   -Performed RM to right index finger to decrease edema, improve joint mobility, decrease pain and improve lymphatic drainage to increase hand function.   -use of iastm tool for deep tissue massage to volar flexors  -cross friction massage to palmar flexor about A1 pulley  - gentle grade II joint glides to PIP  - intrinsic stretches to PIP, DIP        Morteza received therapeutic exercises for 30 minutes including:  -  AROM   DIP blocking  PIP blocking  TGE  FDS isolated    X 10 reps each    Hook with MP block  x 3 min     5.0 # digi-flex X 20 reps isolated    small pom pom p/u LF to palm x 3 containers   Med pom pom hook X 3 containers   Red/  Peach Putty   X 10 squeezes (Red)  X 10 raking/reverse raking (peach)  X 10 isolated flexion (red)   Digit add   X 20 with blue foam   Velcro board pushing and pulling small dowel X 3min           Home Exercises and Education Provided     Education provided: cont HEP, continue milla strap, LMB size C on 30 min 2x day, static orthosis at night per [place] CONNELL as instructed  - Progress towards goals     Written Home Exercises Provided: Patient instructed to cont prior HEP.  Exercises were reviewed and  Morteza was able to demonstrate them prior to the end of the session.  Morteza demonstrated good  understanding of the education provided.   .   See EMR under Patient Instructions for exercises provided initial eval.     Assessment   He is wearing the extension splint more and more but he states it just goes back to being bent. He has pain in the DIP with extension and flexion passively.  Pt. Cont to present with symptoms consistent with volar plate adherence. Pt. Participated with  pain increase with exercises and during manual therapy this date. He is to continue wearing the DynaSplint as instructed. He is using resisted theraputty to aid in pull through of the flexor tendons.       Morteza is progressing well towards his goals and there are no updates to goals at this time. Pt prognosis continues as Excellent. Pt will continue to benefit from skilled outpatient occupational therapy to address the deficits listed in the problem list on initial evaluation, provide pt/family education and to maximize pt's level of independence in the home and community environment.     Anticipated barriers to continued occupational therapy: high grade injury    Pt's spiritual, cultural and educational needs considered and pt agreeable to plan of care and goals.    Goals      Goals:   The following goals were discussed with the patient and patient is in agreement with them as to be addressed in the treatment plan.   Long Term Goals (LTGs); to be met by discharge.  LTG #1: Pt will report a pain level of 0 out of 10 with full gripping   -progressing    LTG #2: Pt will demo improved FOTO score by 20 points.  -progressing  LTG #3: Pt will return to prior level of function for ADLs and household management.  -progressing     Short Term Goals (STGs); to be met within 4 weeks (11/01/2019).  STG #1a: Pt will report 2 out of 10 pain level with full active range of motion.. -progressing  STG #2a: Pt will report/demo [place] with tying  grocery bags for diaper disposal -progressing  STG #2b: Pt will report/demo [place] with using fork and knife for feeding. -progressing  STG #3a: Pt will demonstrate independence with issued HEP.  -progressing  STG #3b: Pt will demo improved Right LF CANCINO by 50  degrees needed to aid with lifting and carrying young children. -progressing       Plan     Continue skilled occupational therapy with individualized plan of care focusing on increasing AROM and strength required for ADLs      Updates/Grading for next session: cont to progress as able    Sheba Hall OTR/L,CHT

## 2020-01-15 ENCOUNTER — CLINICAL SUPPORT (OUTPATIENT)
Dept: REHABILITATION | Facility: HOSPITAL | Age: 45
End: 2020-01-15
Payer: COMMERCIAL

## 2020-01-15 DIAGNOSIS — M79.644 PAIN IN RIGHT FINGER(S): ICD-10-CM

## 2020-01-15 DIAGNOSIS — Z78.9 DECREASED INDEPENDENCE WITH ACTIVITIES OF DAILY LIVING: ICD-10-CM

## 2020-01-15 DIAGNOSIS — M25.641 FINGER STIFFNESS, RIGHT: ICD-10-CM

## 2020-01-15 PROCEDURE — 97110 THERAPEUTIC EXERCISES: CPT | Mod: PN

## 2020-01-15 PROCEDURE — 97140 MANUAL THERAPY 1/> REGIONS: CPT | Mod: PN

## 2020-01-15 PROCEDURE — 97022 WHIRLPOOL THERAPY: CPT | Mod: PN

## 2020-01-15 NOTE — PROGRESS NOTES
"  Occupational Therapy Daily Treatment Note      Date: 1/15/2020  Name: Morteza Cerrato  Clinic Number: [id number]    Therapy Diagnosis:   Encounter Diagnoses   Name Primary?    Pain in right finger(s)     Finger stiffness, right     Decreased independence with activities of daily living      Physician:  Dr. Rufino Darden   Physician Orders: eval and treat  Medical Diagnosis: LF PIP dorsal dislocation   Date of Injury: 08//31/2019  Evaluation Date: 10/03/2019  Insurance Authorization Period Expiration: 12/31/2019  Plan of Care Certification Period: 11/29/2019-01/24/2020  Date of Return to MD: 02/03/2020     Visit # / Visits authorized: 21 / 50       FOTO: initial eval, 6th visit     Time In: 325pm  Time Out: 425pm  Total treatment time: 60 minutes  Total Timed minutes: 45 minutes     Precautions:  Standard    Subjective     Pt reports: "It's bending a little more but not straightening as much"         he was compliant with home exercise program given last session.   Response to previous treatment:continued pain, swelling, stiffness  Functional change: modified  for most activities, cannot put hand flat or put hand into pocket.     Pain: 6/10  Location: right hand at rest, higher with activities    Objective        Edema. Measured in centimeters.    10/3/2019 10/3/2019 10/14/2019 12/18/2019     Left Right Right Right   Long:           P1 6.7 7.5 7.5 7.0    PIP 6.7 8.0 7.9 7.6   P2 5.2 7.5 6.7 6.6    DIP 5.7 6.9 5.9 5.8            Hand ROM. Measured in degrees.  Ext measured laterally*     10/3/2019 10/3/2019 10/14/2019 10/21/2019 11/1/19 11/18/2019 11/20/2019 12/09/2019 12/18/2019 01/08/2020     Left Right Right Right Right Right Right Right Right Right      AROM AROM AROM AROM AROM AROM AROM AROM  AROM AROM           *pre heat *post heat *pre heat *pre heat *pre  heat *pre heat   Long:  MP 95 70 94 95 90 90 90 90 92 97               25/60 25/65 20/60 45/65 55/70 40/58 45/68 44/72 45/70              DIP " 90 7/30 5/35 3/40 12/35 15/43 12/35 12/35 10/36 10/36              CANCINO 290 128 164 (+36) 172 (+8) 133 133 (=) 131 (-2) 136 (+5) 146 (+10) 148 (+2)             At the end of session*    0/90  45/70  15/45  145   At end of session*     0/94  35/75  10/48  172 (+27)             Strength: (in pounds/psi)        Date 12/18/2019 12/18/2019 01/08/2020    Left Right Right   Rung  55 67 (+12)            Morteza received the following supervised modalities after being cleared for contradictions for 15 minutes:     -Fluidotherapy: To right hand , continuous air, 115 deg, air speed 50 to decrease pain, edema & scar tissue, sensory re- education, and increased tissue extensibility prior to therex              Morteza received the following manual therapy techniques for 15 minutes:   -Performed RM to right index finger to decrease edema, improve joint mobility, decrease pain and improve lymphatic drainage to increase hand function.   -use of iastm tool for deep tissue massage to volar flexors  -cross friction massage to palmar flexor about A1 pulley  - gentle grade II joint glides to PIP  - intrinsic stretches to PIP, DIP        Morteza received therapeutic exercises for 30 minutes including:  -  AROM   DIP blocking  PIP blocking  TGE  FDS isolated    X 10 reps each    Hook with MP block  x 3 min     5.0 # digi-flex X 20 reps isolated    small pom pom p/u LF to palm x 3 containers   Med pom pom hook X 3 containers   Red/  Peach Putty   X 10 squeezes (Red)  X 10 raking/reverse raking (peach)  X 10 isolated flexion (red)   Digit add   X 20 with blue foam   Velcro board pushing and pulling small dowel X 3min           Home Exercises and Education Provided     Education provided: cont HEP, continue milla strap, LMB size C on 30 min 2x day, static orthosis at night per [place] CONNELL as instructed  - Progress towards goals     Written Home Exercises Provided: Patient instructed to cont prior HEP.  Exercises were reviewed and  Morteza was able to demonstrate them prior to the end of the session.  Morteza demonstrated good  understanding of the education provided.   .   See EMR under Patient Instructions for exercises provided initial eval.     Assessment   He is wearing the extension splint more and more but he states it just goes back to being bent. He has pain in the DIP with extension and flexion passively.  Pt. Cont to present with symptoms consistent with volar plate adherence. Pt. Participated with  pain increase with exercises and during manual therapy this date. He is to continue wearing the DynaSplint as instructed. He is using resisted theraputty to aid in pull through of the flexor tendons. He states today that it feels the best it ever has after therapy.       Morteza is progressing well towards his goals and there are no updates to goals at this time. Pt prognosis continues as Excellent. Pt will continue to benefit from skilled outpatient occupational therapy to address the deficits listed in the problem list on initial evaluation, provide pt/family education and to maximize pt's level of independence in the home and community environment.     Anticipated barriers to continued occupational therapy: high grade injury    Pt's spiritual, cultural and educational needs considered and pt agreeable to plan of care and goals.    Goals      Goals:   The following goals were discussed with the patient and patient is in agreement with them as to be addressed in the treatment plan.   Long Term Goals (LTGs); to be met by discharge.  LTG #1: Pt will report a pain level of 0 out of 10 with full gripping   -progressing    LTG #2: Pt will demo improved FOTO score by 20 points.  -progressing  LTG #3: Pt will return to prior level of function for ADLs and household management.  -progressing     Short Term Goals (STGs); to be met within 4 weeks (11/01/2019).  STG #1a: Pt will report 2 out of 10 pain level with full active range of motion..  -progressing  STG #2a: Pt will report/demo [place] with tying grocery bags for diaper disposal -progressing  STG #2b: Pt will report/demo [place] with using fork and knife for feeding. -progressing  STG #3a: Pt will demonstrate independence with issued HEP.  -progressing  STG #3b: Pt will demo improved Right LF CANCINO by 50  degrees needed to aid with lifting and carrying young children. -progressing       Plan     Continue skilled occupational therapy with individualized plan of care focusing on increasing AROM and strength required for ADLs      Updates/Grading for next session: cont to progress as able    Sheba Hall OTR/L,CHT

## 2020-01-20 ENCOUNTER — CLINICAL SUPPORT (OUTPATIENT)
Dept: REHABILITATION | Facility: HOSPITAL | Age: 45
End: 2020-01-20
Payer: COMMERCIAL

## 2020-01-20 DIAGNOSIS — M79.644 PAIN IN RIGHT FINGER(S): ICD-10-CM

## 2020-01-20 DIAGNOSIS — M25.641 FINGER STIFFNESS, RIGHT: ICD-10-CM

## 2020-01-20 DIAGNOSIS — Z78.9 DECREASED INDEPENDENCE WITH ACTIVITIES OF DAILY LIVING: ICD-10-CM

## 2020-01-20 PROCEDURE — 97140 MANUAL THERAPY 1/> REGIONS: CPT | Mod: PN

## 2020-01-20 PROCEDURE — 97022 WHIRLPOOL THERAPY: CPT | Mod: PN

## 2020-01-20 PROCEDURE — 97110 THERAPEUTIC EXERCISES: CPT | Mod: PN

## 2020-01-20 NOTE — PROGRESS NOTES
"  Occupational Therapy Daily Treatment Note      Date: 1/20/2020  Name: Morteza Cerrato  Clinic Number: [id number]    Therapy Diagnosis:   Encounter Diagnoses   Name Primary?    Pain in right finger(s)     Finger stiffness, right     Decreased independence with activities of daily living      Physician:  Dr. Rufino Darden   Physician Orders: eval and treat  Medical Diagnosis: LF PIP dorsal dislocation   Date of Injury: 08//31/2019  Evaluation Date: 10/03/2019  Insurance Authorization Period Expiration: 12/31/2019  Plan of Care Certification Period: 11/29/2019-01/24/2020  Date of Return to MD: 02/03/2020     Visit # / Visits authorized: 22 / 50       FOTO: initial eval, 6th visit     Time In: 320pm  Time Out: 425pm  Total treatment time: 60 minutes  Total Timed minutes: 45 minutes     Precautions:  Standard    Subjective     Pt reports: "It'stiff with the cold weather"         he was compliant with home exercise program given last session.   Response to previous treatment:continued pain, swelling, stiffness  Functional change: modified  for most activities, cannot put hand flat or put hand into pocket.     Pain: 6/10  Location: right hand at rest, higher with activities    Objective        Edema. Measured in centimeters.    10/3/2019 10/3/2019 10/14/2019 12/18/2019     Left Right Right Right   Long:           P1 6.7 7.5 7.5 7.0    PIP 6.7 8.0 7.9 7.6   P2 5.2 7.5 6.7 6.6    DIP 5.7 6.9 5.9 5.8            Hand ROM. Measured in degrees.  Ext measured laterally*     10/3/2019 10/3/2019 10/14/2019 10/21/2019 11/1/19 11/18/2019 11/20/2019 12/09/2019 12/18/2019 01/08/2020     Left Right Right Right Right Right Right Right Right Right      AROM AROM AROM AROM AROM AROM AROM AROM  AROM AROM           *pre heat *post heat *pre heat *pre heat *pre  heat *pre heat   Long:  MP 95 70 94 95 90 90 90 90 92 97               25/60 25/65 20/60 45/65 55/70 40/58 45/68 44/72 45/70              DIP 90 7/30 5/35 3/40 12/35 " 15/43 12/35 12/35 10/36 10/36              CANCINO 290 128 164 (+36) 172 (+8) 133 133 (=) 131 (-2) 136 (+5) 146 (+10) 148 (+2)             At the end of session*    0/90  45/70  15/45  145   At end of session*     0/94  35/75  10/48  172 (+27)             Strength: (in pounds/psi)        Date 12/18/2019 12/18/2019 01/08/2020    Left Right Right   Rung  55 67 (+12)            Morteza received the following supervised modalities after being cleared for contradictions for 15 minutes:     -Fluidotherapy: To right hand , continuous air, 115 deg, air speed 50 to decrease pain, edema & scar tissue, sensory re- education, and increased tissue extensibility prior to therex              Morteza received the following manual therapy techniques for 15 minutes:   -Performed RM to right index finger to decrease edema, improve joint mobility, decrease pain and improve lymphatic drainage to increase hand function.   -use of [employer] tool for deep tissue massage to volar flexors  -cross friction massage to palmar flexor about A1 pulley  - gentle grade II joint glides to PIP  - intrinsic stretches to PIP, DIP        Morteza received therapeutic exercises for 30 minutes including:  -  AROM   DIP blocking  PIP blocking  TGE  FDS isolated    X 10 reps each    Hook with MP block  x 3 min     5.0 # digi-flex X 20 reps isolated    small pom pom p/u LF to palm x 3 containers   Med pom pom hook X 3 containers   Red/  Peach Putty   X 10 squeezes (Red)  X 10 raking/reverse raking (peach)  X 10 isolated flexion (red)   Digit add   X 20 with blue foam   Velcro board pushing and pulling small dowel X 3min           Home Exercises and Education Provided     Education provided: cont HEP, continue milla strap, LMB size C on 30 min 2x day, static orthosis at night per [place] CONNELL as instructed  - Progress towards goals     Written Home Exercises Provided: Patient instructed to cont prior HEP.  Exercises were reviewed and Morteza was able to  demonstrate them prior to the end of the session.  Morteza demonstrated good  understanding of the education provided.   .   See EMR under Patient Instructions for exercises provided initial eval.     Assessment   He states he was able to pressure wash the patio this weekend and it gave his hand a pretty good workout from the sustained grasp. He states it feels more stiff with the colder weather.  He has pain in the DIP with extension and flexion passively.  Pt. Cont to present with symptoms consistent with volar plate adherence. Pt. Participated with  pain increase with exercises and during manual therapy this date. He is to continue wearing the DynaSplint as instructed. He is using resisted theraputty to aid in pull through of the flexor tendons. He states today that it feels the best it ever has after therapy.       Morteza is progressing well towards his goals and there are no updates to goals at this time. Pt prognosis continues as Excellent. Pt will continue to benefit from skilled outpatient occupational therapy to address the deficits listed in the problem list on initial evaluation, provide pt/family education and to maximize pt's level of independence in the home and community environment.     Anticipated barriers to continued occupational therapy: high grade injury    Pt's spiritual, cultural and educational needs considered and pt agreeable to plan of care and goals.    Goals      Goals:   The following goals were discussed with the patient and patient is in agreement with them as to be addressed in the treatment plan.   Long Term Goals (LTGs); to be met by discharge.  LTG #1: Pt will report a pain level of 0 out of 10 with full gripping   -progressing    LTG #2: Pt will demo improved FOTO score by 20 points.  -progressing  LTG #3: Pt will return to prior level of function for ADLs and household management.  -progressing     Short Term Goals (STGs); to be met within 4 weeks (11/01/2019).  STG #1a: Pt  will report 2 out of 10 pain level with full active range of motion.. -progressing  STG #2a: Pt will report/demo [place] with tying grocery bags for diaper disposal -progressing  STG #2b: Pt will report/demo [place] with using fork and knife for feeding. -progressing  STG #3a: Pt will demonstrate independence with issued HEP.  -progressing  STG #3b: Pt will demo improved Right LF CANCINO by 50  degrees needed to aid with lifting and carrying young children. -progressing       Plan     Continue skilled occupational therapy with individualized plan of care focusing on increasing AROM and strength required for ADLs      Updates/Grading for next session: cont to progress as able    Sheba Hall, OTR/L,CHT

## 2020-01-22 ENCOUNTER — CLINICAL SUPPORT (OUTPATIENT)
Dept: REHABILITATION | Facility: HOSPITAL | Age: 45
End: 2020-01-22
Payer: COMMERCIAL

## 2020-01-22 DIAGNOSIS — M25.641 FINGER STIFFNESS, RIGHT: ICD-10-CM

## 2020-01-22 DIAGNOSIS — Z78.9 DECREASED INDEPENDENCE WITH ACTIVITIES OF DAILY LIVING: ICD-10-CM

## 2020-01-22 DIAGNOSIS — M79.644 PAIN IN RIGHT FINGER(S): ICD-10-CM

## 2020-01-22 PROCEDURE — 97110 THERAPEUTIC EXERCISES: CPT | Mod: PN

## 2020-01-22 PROCEDURE — 97140 MANUAL THERAPY 1/> REGIONS: CPT | Mod: PN

## 2020-01-22 PROCEDURE — 97022 WHIRLPOOL THERAPY: CPT | Mod: PN

## 2020-01-22 NOTE — PROGRESS NOTES
"  Occupational Therapy Daily Treatment Note      Date: 1/22/2020  Name: Morteza Cerrato  Clinic Number: [id number]    Therapy Diagnosis:   Encounter Diagnoses   Name Primary?    Pain in right finger(s)     Finger stiffness, right     Decreased independence with activities of daily living      Physician:  Dr. Rufino Darden   Physician Orders: eval and treat  Medical Diagnosis: LF PIP dorsal dislocation   Date of Injury: 08//31/2019  Evaluation Date: 10/03/2019  Insurance Authorization Period Expiration: 12/31/2019  Plan of Care Certification Period: 11/29/2019-01/24/2020  Date of Return to MD: 02/03/2020     Visit # / Visits authorized: 23 / 50       FOTO: initial eval, 6th visit     Time In: 320pm  Time Out: 420pm  Total treatment time: 60 minutes  Total Timed minutes: 45 minutes     Precautions:  Standard    Subjective     Pt reports: "It'stiff with the cold weather"         he was compliant with home exercise program given last session.   Response to previous treatment:continued pain, swelling, stiffness  Functional change: modified  for most activities, cannot put hand flat or put hand into pocket.     Pain: 6/10  Location: right hand at rest, higher with activities    Objective        Edema. Measured in centimeters.    10/3/2019 10/3/2019 10/14/2019 12/18/2019     Left Right Right Right   Long:           P1 6.7 7.5 7.5 7.0    PIP 6.7 8.0 7.9 7.6   P2 5.2 7.5 6.7 6.6    DIP 5.7 6.9 5.9 5.8            Hand ROM. Measured in degrees.  Ext measured laterally*     10/3/2019 10/3/2019 10/14/2019 10/21/2019 11/1/19 11/18/2019 11/20/2019 12/09/2019 12/18/2019 01/08/2020     Left Right Right Right Right Right Right Right Right Right      AROM AROM AROM AROM AROM AROM AROM AROM  AROM AROM           *pre heat *post heat *pre heat *pre heat *pre  heat *pre heat   Long:  MP 95 70 94 95 90 90 90 90 92 97               25/60 25/65 20/60 45/65 55/70 40/58 45/68 44/72 45/70              DIP 90 7/30 5/35 3/40 12/35 " 15/43 12/35 12/35 10/36 10/36              CANCINO 290 128 164 (+36) 172 (+8) 133 133 (=) 131 (-2) 136 (+5) 146 (+10) 148 (+2)             At the end of session*    0/90  45/70  15/45  145   At end of session*     0/94  35/75  10/48  172 (+27)             Strength: (in pounds/psi)        Date 12/18/2019 12/18/2019 01/08/2020    Left Right Right   Rung  55 67 (+12)            Morteza received the following supervised modalities after being cleared for contradictions for 15 minutes:     -Fluidotherapy: To right hand , continuous air, 115 deg, air speed 50 to decrease pain, edema & scar tissue, sensory re- education, and increased tissue extensibility prior to therex              Morteza received the following manual therapy techniques for 15 minutes:   -Performed RM to right index finger to decrease edema, improve joint mobility, decrease pain and improve lymphatic drainage to increase hand function.   -use of [employer] tool for deep tissue massage to volar flexors  -cross friction massage to palmar flexor about A1 pulley  - gentle grade II joint glides to PIP  - intrinsic stretches to PIP, DIP        Morteza received therapeutic exercises for 30 minutes including:  -  AROM   DIP blocking  PIP blocking  TGE  FDS isolated    X 10 reps each    Hook with MP block  x 3 min     5.0 # digi-flex X 20 reps isolated    small pom pom p/u LF to palm x 3 containers   Med pom pom hook X 3 containers   Red/  Peach Putty   X 10 squeezes (Red)  X 10 raking/reverse raking (peach)  X 10 isolated flexion (red)   Velcro board pushing and pulling small dowel X 3min           Home Exercises and Education Provided     Education provided: cont HEP, continue milla strap, LMB size C on 30 min 2x day, static orthosis at night per [place] CONNELL as instructed  - Progress towards goals     Written Home Exercises Provided: Patient instructed to cont prior HEP.  Exercises were reviewed and Morteza was able to demonstrate them prior to the end of  the session.  Morteza demonstrated good  understanding of the education provided.   .   See EMR under Patient Instructions for exercises provided initial eval.     Assessment   He states he was able to pressure wash the patio this weekend and it gave his hand a pretty good workout from the sustained grasp. He states it feels more stiff with the colder weather.  He has pain in the DIP with extension and flexion passively.  Pt. Cont to present with symptoms consistent with volar plate adherence. Pt. Participated with  pain increase with exercises and during manual therapy this date. He is to continue wearing the DynaSplint as instructed. He is using resisted theraputty to aid in pull through of the flexor tendons. He states today that it feels the best it ever has after therapy.       Morteza is progressing well towards his goals and there are no updates to goals at this time. Pt prognosis continues as Excellent. Pt will continue to benefit from skilled outpatient occupational therapy to address the deficits listed in the problem list on initial evaluation, provide pt/family education and to maximize pt's level of independence in the home and community environment.     Anticipated barriers to continued occupational therapy: high grade injury    Pt's spiritual, cultural and educational needs considered and pt agreeable to plan of care and goals.    Goals      Goals:   The following goals were discussed with the patient and patient is in agreement with them as to be addressed in the treatment plan.   Long Term Goals (LTGs); to be met by discharge.  LTG #1: Pt will report a pain level of 0 out of 10 with full gripping   -progressing    LTG #2: Pt will demo improved FOTO score by 20 points.  -progressing  LTG #3: Pt will return to prior level of function for ADLs and household management.  -progressing     Short Term Goals (STGs); to be met within 4 weeks (11/01/2019).  STG #1a: Pt will report 2 out of 10 pain level with  full active range of motion.. -progressing  STG #2a: Pt will report/demo [place] with tying grocery bags for diaper disposal -progressing  STG #2b: Pt will report/demo [place] with using fork and knife for feeding. -progressing  STG #3a: Pt will demonstrate independence with issued HEP.  -progressing  STG #3b: Pt will demo improved Right LF CANCINO by 50  degrees needed to aid with lifting and carrying young children. -progressing       Plan     Continue skilled occupational therapy with individualized plan of care focusing on increasing AROM and strength required for ADLs      Updates/Grading for next session: cont to progress as able    Sheba Hall, OTR/L,CHT

## 2020-01-27 ENCOUNTER — CLINICAL SUPPORT (OUTPATIENT)
Dept: REHABILITATION | Facility: HOSPITAL | Age: 45
End: 2020-01-27
Payer: COMMERCIAL

## 2020-01-27 DIAGNOSIS — Z78.9 DECREASED INDEPENDENCE WITH ACTIVITIES OF DAILY LIVING: ICD-10-CM

## 2020-01-27 DIAGNOSIS — M25.641 FINGER STIFFNESS, RIGHT: ICD-10-CM

## 2020-01-27 DIAGNOSIS — M79.644 PAIN IN RIGHT FINGER(S): ICD-10-CM

## 2020-01-27 PROCEDURE — 97022 WHIRLPOOL THERAPY: CPT | Mod: PN

## 2020-01-27 PROCEDURE — 97140 MANUAL THERAPY 1/> REGIONS: CPT | Mod: PN

## 2020-01-27 PROCEDURE — 97110 THERAPEUTIC EXERCISES: CPT | Mod: PN

## 2020-01-27 NOTE — PROGRESS NOTES
"  Occupational Therapy Daily Treatment Note      Date: 1/27/2020  Name: Morteza Cerrato  Clinic Number: [id number]    Therapy Diagnosis:   Encounter Diagnoses   Name Primary?    Pain in right finger(s)     Finger stiffness, right     Decreased independence with activities of daily living      Physician:  Dr. Rufino Darden   Physician Orders: eval and treat  Medical Diagnosis: LF PIP dorsal dislocation   Date of Injury: 08//31/2019  Evaluation Date: 10/03/2019  Insurance Authorization Period Expiration: 12/31/2019  Plan of Care Certification Period: 11/29/2019-01/24/2020  Date of Return to MD: 02/03/2020     Visit # / Visits authorized: 24 / 50       FOTO: initial eval, 6th visit     Time In: 330pm  Time Out: 430pm  Total treatment time: 60 minutes  Total Timed minutes: 45 minutes     Precautions:  Standard    Subjective     Pt reports: "It's been really stiff. I'm not sure why"         he was compliant with home exercise program given last session.   Response to previous treatment:continued pain, swelling, stiffness  Functional change: modified  for most activities, cannot put hand flat or put hand into pocket.     Pain: 6/10  Location: right hand at rest, higher with activities    Objective        Edema. Measured in centimeters.    10/3/2019 10/3/2019 10/14/2019 12/18/2019     Left Right Right Right   Long:           P1 6.7 7.5 7.5 7.0    PIP 6.7 8.0 7.9 7.6   P2 5.2 7.5 6.7 6.6    DIP 5.7 6.9 5.9 5.8            Hand ROM. Measured in degrees.  Ext measured laterally*     10/3/2019 10/3/2019 10/14/2019 10/21/2019 11/1/19 11/18/2019 11/20/2019 12/09/2019 12/18/2019 01/08/2020     Left Right Right Right Right Right Right Right Right Right      AROM AROM AROM AROM AROM AROM AROM AROM  AROM AROM           *pre heat *post heat *pre heat *pre heat *pre  heat *pre heat   Long:  MP 95 70 94 95 90 90 90 90 92 97               25/60 25/65 20/60 45/65 55/70 40/58 45/68 44/72 45/70              DIP 90 7/30 5/35 3/40 " 12/35 15/43 12/35 12/35 10/36 10/36              CANCINO 290 128 164 (+36) 172 (+8) 133 133 (=) 131 (-2) 136 (+5) 146 (+10) 148 (+2)             At the end of session*    0/90  45/70  15/45  145   At end of session*     0/94  35/75  10/48  172 (+27)             Strength: (in pounds/psi)        Date 12/18/2019 12/18/2019 01/08/2020    Left Right Right   Rung  55 67 (+12)            Morteza received the following supervised modalities after being cleared for contradictions for 15 minutes:     -Fluidotherapy: To right hand , continuous air, 115 deg, air speed 50 to decrease pain, edema & scar tissue, sensory re- education, and increased tissue extensibility prior to therex              Morteza received the following manual therapy techniques for 15 minutes:   -Performed RM to right index finger to decrease edema, improve joint mobility, decrease pain and improve lymphatic drainage to increase hand function.   -use of [employer] tool for deep tissue massage to volar flexors  -cross friction massage to palmar flexor about A1 pulley  - gentle grade II joint glides to PIP  - intrinsic stretches to PIP, DIP        Morteza received therapeutic exercises for 30 minutes including:  -  AROM   DIP blocking  PIP blocking  TGE  FDS isolated    X 10 reps each    Hook with MP block  x 3 min     5.0 # digi-flex X 20 reps isolated    small pom pom p/u LF to palm x 3 containers   Med pom pom hook X 3 containers   Red/  Peach Putty   X 10 squeezes (Red)  X 10 raking/reverse raking (peach)  X 10 isolated flexion (red)   Velcro board pushing and pulling small dowel X 3min    IsometricFDP  Green ring x 20          Home Exercises and Education Provided     Education provided: cont HEP, continue milla strap, LMB size C on 30 min 2x day, static orthosis at night per [place] CONNELL as instructed  - Progress towards goals     Written Home Exercises Provided: Patient instructed to cont prior HEP.  Exercises were reviewed and Morteza was able to  demonstrate them prior to the end of the session.  Morteza demonstrated good  understanding of the education provided.   .   See EMR under Patient Instructions for exercises provided initial eval.     Assessment   . He states it feels more stiff. He did some woodworking task this weekend building a bookcase.  He has pain in the DIP with extension and flexion passively.  Pt. Cont to present with symptoms consistent with volar plate adherence. Pt. Participated with  pain increase with exercises and during manual therapy this date. He is to continue wearing the DynaSplint as instructed. He is using resisted theraputty to aid in pull through of the flexor tendons. Added FDP isometrics for strengthening of FDP this date. Pt. Continues to be challenged by higher resistance putty.        Morteza is progressing well towards his goals and there are no updates to goals at this time. Pt prognosis continues as Excellent. Pt will continue to benefit from skilled outpatient occupational therapy to address the deficits listed in the problem list on initial evaluation, provide pt/family education and to maximize pt's level of independence in the home and community environment.     Anticipated barriers to continued occupational therapy: high grade injury    Pt's spiritual, cultural and educational needs considered and pt agreeable to plan of care and goals.    Goals      Goals:   The following goals were discussed with the patient and patient is in agreement with them as to be addressed in the treatment plan.   Long Term Goals (LTGs); to be met by discharge.  LTG #1: Pt will report a pain level of 0 out of 10 with full gripping   -progressing    LTG #2: Pt will demo improved FOTO score by 20 points.  -progressing  LTG #3: Pt will return to prior level of function for ADLs and household management.  -progressing     Short Term Goals (STGs); to be met within 4 weeks (11/01/2019).  STG #1a: Pt will report 2 out of 10 pain level with  full active range of motion.. -progressing  STG #2a: Pt will report/demo [place] with tying grocery bags for diaper disposal -progressing  STG #2b: Pt will report/demo [place] with using fork and knife for feeding. -progressing  STG #3a: Pt will demonstrate independence with issued HEP.  -progressing  STG #3b: Pt will demo improved Right LF CANCINO by 50  degrees needed to aid with lifting and carrying young children. -progressing       Plan     Continue skilled occupational therapy with individualized plan of care focusing on increasing AROM and strength required for ADLs      Updates/Grading for next session: cont to progress as able    Sheba Hall, OTR/L,CHT

## 2020-01-29 ENCOUNTER — PATIENT MESSAGE (OUTPATIENT)
Dept: REHABILITATION | Facility: HOSPITAL | Age: 45
End: 2020-01-29

## 2020-01-29 ENCOUNTER — DOCUMENTATION ONLY (OUTPATIENT)
Dept: REHABILITATION | Facility: HOSPITAL | Age: 45
End: 2020-01-29

## 2020-01-29 ENCOUNTER — CLINICAL SUPPORT (OUTPATIENT)
Dept: REHABILITATION | Facility: HOSPITAL | Age: 45
End: 2020-01-29
Payer: COMMERCIAL

## 2020-01-29 DIAGNOSIS — Z78.9 DECREASED INDEPENDENCE WITH ACTIVITIES OF DAILY LIVING: ICD-10-CM

## 2020-01-29 DIAGNOSIS — M25.641 FINGER STIFFNESS, RIGHT: ICD-10-CM

## 2020-01-29 DIAGNOSIS — M79.644 PAIN IN RIGHT FINGER(S): ICD-10-CM

## 2020-01-29 PROCEDURE — 97140 MANUAL THERAPY 1/> REGIONS: CPT | Mod: PN

## 2020-01-29 PROCEDURE — 97022 WHIRLPOOL THERAPY: CPT | Mod: PN

## 2020-01-29 PROCEDURE — 97110 THERAPEUTIC EXERCISES: CPT | Mod: PN

## 2020-01-29 NOTE — PROGRESS NOTES
OCCUPATIONAL THERAPY RETURN TO DOCTOR PROGRESS NOTE  Patient: Morteza Cerrato  MRN: [id number]  Date of Evaluation: 10/03/2019  Referring Physician:  Dr. Rufino Darden  Next MD visit: 01/27/2020  Primary Diagnosis: S63.252A (ICD-10-CM) - Closed dislocation of right middle finger  Treatment Diagnosis:   1. Pain in right finger(s)     2. Finger stiffness, right     3. Decreased independence with activities of daily living       Date of Injury: 08/3/2019    TOTAL VISITS: 24    Pt. Was evaluated by skilled OT 4.5 weeks post injury. He has been wearing the extension splint more. He has stopped wearing the flexion splint .He demonstrates symptoms consistent with volar plate adherence. He is tolerating manual therapy interventions including joint glides, mobilization with movement, intrinsic and extrinsic stretches, use of ASTYM tools and passive motion- with less pain reported. He has been able to use the hand more but feels he is using a modified grasp. He feels he is progressing well with flexion but not with extension. AROM with only 10 degree increase from last MD appointment.  strength did progress with 27# increase.     Please see objective measurements below for detailed measurements.     Please contact me with regards to further instructions, Thank you,    Sheba Hall, OTR/L, CHT  Occupational therapist, Certified Hand Therapist  [facility]  [phone] phone  [phone] fax  [email or web address]    Objective:         Hand ROM. Measured in degrees.  Ext measured laterally*     10/3/2019 10/3/2019 10/14/2019 10/21/2019 11/1/19 11/18/2019 11/20/2019 12/09/2019 12/18/2019 01/08/2020 01/29/2020     Left Right Right Right Right Right Right Right Right Right  Right     AROM AROM AROM AROM AROM AROM AROM AROM  AROM AROM AROM              *pre heat *post heat *pre heat *pre heat *pre  heat *pre heat *pre heat   Long:  MP 95 70 94 95 90 90 90 90 92 97 95               25/60 25/65  20/60 45/65 55/70 40/58 45/68 44/72 45/70 40/74              DIP 90 7/30 5/35 3/40 12/35 15/43 12/35 12/35 10/36 10/36 18/45              CANCINO 290 128 164 (+36) 172 (+8) 133 133 (=) 131 (-2) 136 (+5) 146 (+10) 148 (+2) 156 (+8)                 At the end of session*     0/90  45/70  15/45  145    At end of session*      0/94  35/75  10/48  172 (+27)                  Strength: (in pounds/psi)           Date 12/18/2019 12/18/2019 01/08/2020 01/29/2020     Left Right Right Right   Rung  55 67 (+12) 95 (+28)

## 2020-01-29 NOTE — PROGRESS NOTES
"  Occupational Therapy Daily Treatment Note      Date: 1/29/2020  Name: Morteza Cerrato  Clinic Number: [id number]    Therapy Diagnosis:   Encounter Diagnoses   Name Primary?    Pain in right finger(s)     Finger stiffness, right     Decreased independence with activities of daily living      Physician:  Dr. Rufino Darden   Physician Orders: eval and treat  Medical Diagnosis: LF PIP dorsal dislocation   Date of Injury: 08//31/2019  Evaluation Date: 10/03/2019  Insurance Authorization Period Expiration: 12/31/2019  Plan of Care Certification Period: 11/29/2019-01/24/2020  Date of Return to MD: 02/03/2020     Visit # / Visits authorized: 24 / 50       FOTO: initial eval, 6th visit, 24th visit     Time In: 330pm  Time Out: 430pm  Total treatment time: 60 minutes  Total Timed minutes: 45 minutes     Precautions:  Standard    Subjective     Pt reports: "I've been wearing the extension splint pretty much all day today"         he was compliant with home exercise program given last session.   Response to previous treatment:continued pain, swelling, stiffness  Functional change: modified  for most activities, cannot put hand flat or put hand into pocket.     Pain: 6/10  Location: right hand at rest, higher with activities    Objective        Edema. Measured in centimeters.    10/3/2019 10/3/2019 10/14/2019 12/18/2019     Left Right Right Right   Long:           P1 6.7 7.5 7.5 7.0    PIP 6.7 8.0 7.9 7.6   P2 5.2 7.5 6.7 6.6    DIP 5.7 6.9 5.9 5.8            Hand ROM. Measured in degrees.  Ext measured laterally*     10/3/2019 10/3/2019 10/14/2019 10/21/2019 11/1/19 11/18/2019 11/20/2019 12/09/2019 12/18/2019 01/08/2020 01/29/2020     Left Right Right Right Right Right Right Right Right Right  Right     AROM AROM AROM AROM AROM AROM AROM AROM  AROM AROM AROM            *pre heat *post heat *pre heat *pre heat *pre  heat *pre heat *pre heat   Long:  MP 95 70 94 95 90 90 90 90 92 97 95               25/60 25/65 " 20/60 45/65 55/70 40/58 45/68 44/72 45/70 40/74              DIP 90 7/30 5/35 3/40 12/35 15/43 12/35 12/35 10/36 10/36 18/45              CANCINO 290 128 164 (+36) 172 (+8) 133 133 (=) 131 (-2) 136 (+5) 146 (+10) 148 (+2) 156 (+8)             At the end of session*    0/90  45/70  15/45  145   At end of session*     0/94  35/75  10/48  172 (+27)              Strength: (in pounds/psi)        Date 12/18/2019 12/18/2019 01/08/2020 01/29/2020    Left Right Right Right   Rung  55 67 (+12) 95 (+28)            Morteza received the following supervised modalities after being cleared for contradictions for 15 minutes:     -Fluidotherapy: To right hand , continuous air, 115 deg, air speed 50 to decrease pain, edema & scar tissue, sensory re- education, and increased tissue extensibility prior to therex              Morteza received the following manual therapy techniques for 15 minutes:   -Performed RM to right index finger to decrease edema, improve joint mobility, decrease pain and improve lymphatic drainage to increase hand function.   -use of iastm tool for deep tissue massage to volar flexors  -cross friction massage to palmar flexor about A1 pulley  - gentle grade II joint glides to PIP  - intrinsic stretches to PIP, DIP        Morteza received therapeutic exercises for 30 minutes including:  -  AROM   DIP blocking  PIP blocking  TGE  FDS isolated    X 10 reps each    Hook with MP block  x 3 min     5.0 # digi-flex X 20 reps isolated    small pom pom p/u LF to palm x 3 containers   Med pom pom hook X 3 containers   Red/  Peach Putty   X 10 squeezes (Red)  X 10 raking/reverse raking (peach)  X 10 isolated flexion (red)   Velcro board pushing and pulling small dowel X 3min    IsometricFDP  Green ring x 20          Home Exercises and Education Provided     Education provided: cont HEP, continue milla strap, LMB size C on 30 min 2x day, static orthosis at night per MD, Hnenaaspmodestat as instructed  - Progress towards  goals     Written Home Exercises Provided: Patient instructed to cont prior HEP.  Exercises were reviewed and Morteza was able to demonstrate them prior to the end of the session.  Morteza demonstrated good  understanding of the education provided.   .   See EMR under Patient Instructions for exercises provided initial eval.     Assessment   He states he is wearing his extension splint more this date. AROM with about 8 more degrees of CANCINO and  strength with significant increase by 27#.   He has pain in the DIP with extension and flexion passively.  Pt. Cont to present with symptoms consistent with volar plate adherence. Pt. Participated with  pain increase with exercises and during manual therapy this date. He is to continue wearing the DynaSplint as instructed. He is using resisted theraputty to aid in pull through of the flexor tendons. Cont FDP isometrics for strengthening of FDP this date. Pt. Continues to be challenged by higher resistance putty.        Morteza is progressing well towards his goals and there are no updates to goals at this time. Pt prognosis continues as Excellent. Pt will continue to benefit from skilled outpatient occupational therapy to address the deficits listed in the problem list on initial evaluation, provide pt/family education and to maximize pt's level of independence in the home and community environment.     Anticipated barriers to continued occupational therapy: high grade injury    Pt's spiritual, cultural and educational needs considered and pt agreeable to plan of care and goals.    Goals      Goals:   The following goals were discussed with the patient and patient is in agreement with them as to be addressed in the treatment plan.   Long Term Goals (LTGs); to be met by discharge.  LTG #1: Pt will report a pain level of 0 out of 10 with full gripping   -progressing    LTG #2: Pt will demo improved FOTO score by 20 points.  -progressing  LTG #3: Pt will return to prior level  of function for ADLs and household management.  -progressing     Short Term Goals (STGs); to be met within 4 weeks (11/01/2019).  STG #1a: Pt will report 2 out of 10 pain level with full active range of motion.. -progressing  STG #2a: Pt will report/demo [place] with tying grocery bags for diaper disposal -progressing  STG #2b: Pt will report/demo [place] with using fork and knife for feeding. -progressing  STG #3a: Pt will demonstrate independence with issued HEP.  -progressing  STG #3b: Pt will demo improved Right LF CANCINO by 50  degrees needed to aid with lifting and carrying young children. -progressing       Plan     Continue skilled occupational therapy with individualized plan of care focusing on increasing AROM and strength required for ADLs      Updates/Grading for next session: cont to progress as able.Pt. To see MD on Monday 1/27 and will determine need for ongoing therapy at this point.     Sheba Hall, OTR/L,CHT

## 2020-03-26 ENCOUNTER — DOCUMENTATION ONLY (OUTPATIENT)
Dept: REHABILITATION | Facility: HOSPITAL | Age: 45
End: 2020-03-26

## 2020-03-26 PROBLEM — Z78.9 DECREASED INDEPENDENCE WITH ACTIVITIES OF DAILY LIVING: Status: RESOLVED | Noted: 2019-10-03 | Resolved: 2020-03-26

## 2020-03-26 PROBLEM — M25.641 FINGER STIFFNESS, RIGHT: Status: RESOLVED | Noted: 2019-10-03 | Resolved: 2020-03-26

## 2020-03-26 PROBLEM — M79.644 PAIN IN RIGHT FINGER(S): Status: RESOLVED | Noted: 2019-10-03 | Resolved: 2020-03-26

## 2020-03-26 NOTE — PROGRESS NOTES
Outpatient Therapy Discharge Summary     Name: Morteza Cerrato  Clinic Number: [id number]    Therapy Diagnosis:        Encounter Diagnoses   Name Primary?    Pain in right finger(s)      Finger stiffness, right      Decreased independence with activities of daily living        Physician:  Dr. Rufino Darden   Physician Orders: eval and treat  Medical Diagnosis: LF PIP dorsal dislocation   Date of Injury: 08//31/2019  Evaluation Date: 10/03/2019    Date of Last visit: 1/29/2020  Total Visits Received: 24      Assessment      Pt returned to doctor on 1/27/2020, and did not return to therapy.    Goals:   Long Term Goals (LTGs); to be met by discharge.  LTG #1: Pt will report a pain level of 0 out of 10 with full gripping   -not met   LTG #2: Pt will demo improved FOTO score by 20 points.  -not met  LTG #3: Pt will return to prior level of function for ADLs and household management.  -not met     Short Term Goals (STGs); to be met within 4 weeks (11/01/2019).  STG #1a: Pt will report 2 out of 10 pain level with full active range of motion.. -not met  STG #2a: Pt will report/demo [place] with tying grocery bags for diaper disposal -not met  STG #2b: Pt will report/demo [place] with using fork and knife for feeding. -met  STG #3a: Pt will demonstrate independence with issued HEP.  -met  STG #3b: Pt will demo improved Right LF CANCINO by 50  degrees needed to aid with lifting and carrying young children. -not met       Discharge reason: Other:  Pt did not return to therapy after follow up with MD.    Plan   This patient is discharged from Occupational Therapy